# Patient Record
Sex: FEMALE | Race: BLACK OR AFRICAN AMERICAN | NOT HISPANIC OR LATINO | Employment: FULL TIME | URBAN - METROPOLITAN AREA
[De-identification: names, ages, dates, MRNs, and addresses within clinical notes are randomized per-mention and may not be internally consistent; named-entity substitution may affect disease eponyms.]

---

## 2017-05-11 ENCOUNTER — OFFICE VISIT (OUTPATIENT)
Dept: FAMILY MEDICINE CLINIC | Facility: CLINIC | Age: 22
End: 2017-05-11

## 2017-05-11 VITALS
WEIGHT: 152 LBS | HEART RATE: 80 BPM | SYSTOLIC BLOOD PRESSURE: 106 MMHG | DIASTOLIC BLOOD PRESSURE: 82 MMHG | HEIGHT: 60 IN | BODY MASS INDEX: 29.84 KG/M2 | RESPIRATION RATE: 20 BRPM | TEMPERATURE: 97 F

## 2017-05-11 DIAGNOSIS — Z76.89 ENCOUNTER TO ESTABLISH CARE: Primary | ICD-10-CM

## 2017-05-11 DIAGNOSIS — Z11.1 SCREENING FOR TUBERCULOSIS: ICD-10-CM

## 2017-05-11 PROCEDURE — 99203 OFFICE O/P NEW LOW 30 MIN: CPT | Performed by: PHYSICIAN ASSISTANT

## 2017-05-11 RX ORDER — DROSPIRENONE AND ETHINYL ESTRADIOL 0.02-3(28)
1 KIT ORAL DAILY
COMMUNITY
End: 2018-12-05 | Stop reason: HOSPADM

## 2017-05-22 LAB
ANNOTATION COMMENT IMP: NORMAL
GAMMA INTERFERON BACKGROUND BLD IA-ACNC: 0.04 IU/ML
M TB IFN-G BLD-IMP: NEGATIVE
M TB IFN-G CD4+ BCKGRND COR BLD-ACNC: <0 IU/ML
M TB IFN-G CD4+ T-CELLS BLD-ACNC: 0.03 IU/ML
MITOGEN IGNF BLD-ACNC: >10 IU/ML
QUANTIFERON INCUBATION: NORMAL
SERVICE CMNT-IMP: NORMAL

## 2018-04-27 ENCOUNTER — LAB REQUISITION (OUTPATIENT)
Dept: LAB | Facility: HOSPITAL | Age: 23
End: 2018-04-27

## 2018-04-27 DIAGNOSIS — Z00.00 ROUTINE GENERAL MEDICAL EXAMINATION AT A HEALTH CARE FACILITY: ICD-10-CM

## 2018-04-27 LAB
EXTERNAL CHLAMYDIA SCREEN: NEGATIVE
EXTERNAL GONORRHEA SCREEN: NEGATIVE
EXTERNAL HEPATITIS B SURFACE ANTIGEN: NEGATIVE
EXTERNAL HEPATITIS C AB: NEGATIVE
EXTERNAL RUBELLA QUALITATIVE: NORMAL
EXTERNAL SYPHILIS RPR SCREEN: NORMAL
EXTERNAL URINE DRUG SCREEN: NEGATIVE
HIV1 P24 AG SERPL QL IA: NORMAL

## 2018-04-27 PROCEDURE — 36415 COLL VENOUS BLD VENIPUNCTURE: CPT | Performed by: OBSTETRICS & GYNECOLOGY

## 2018-05-30 ENCOUNTER — LAB REQUISITION (OUTPATIENT)
Dept: LAB | Facility: HOSPITAL | Age: 23
End: 2018-05-30

## 2018-05-30 DIAGNOSIS — Z00.00 ROUTINE GENERAL MEDICAL EXAMINATION AT A HEALTH CARE FACILITY: ICD-10-CM

## 2018-05-30 PROCEDURE — 36415 COLL VENOUS BLD VENIPUNCTURE: CPT

## 2018-09-10 LAB — EXTERNAL GTT 1 HOUR: NORMAL

## 2018-10-30 ENCOUNTER — LAB REQUISITION (OUTPATIENT)
Dept: LAB | Facility: HOSPITAL | Age: 23
End: 2018-10-30

## 2018-10-30 DIAGNOSIS — Z34.03 ENCOUNTER FOR SUPERVISION OF NORMAL FIRST PREGNANCY IN THIRD TRIMESTER: ICD-10-CM

## 2018-10-30 PROCEDURE — 87081 CULTURE SCREEN ONLY: CPT | Performed by: OBSTETRICS & GYNECOLOGY

## 2018-11-02 LAB — BACTERIA SPEC AEROBE CULT: NORMAL

## 2018-11-30 ENCOUNTER — HOSPITAL ENCOUNTER (OUTPATIENT)
Facility: HOSPITAL | Age: 23
End: 2018-11-30
Attending: OBSTETRICS & GYNECOLOGY | Admitting: OBSTETRICS & GYNECOLOGY

## 2018-11-30 ENCOUNTER — PREP FOR SURGERY (OUTPATIENT)
Dept: OTHER | Facility: HOSPITAL | Age: 23
End: 2018-11-30

## 2018-11-30 DIAGNOSIS — Z3A.40 40 WEEKS GESTATION OF PREGNANCY: Primary | ICD-10-CM

## 2018-11-30 LAB
ABO GROUP BLD: NORMAL
ALP SERPL-CCNC: 221 U/L (ref 25–100)
ALT SERPL W P-5'-P-CCNC: 14 U/L (ref 7–40)
AST SERPL-CCNC: 26 U/L (ref 0–33)
BILIRUB SERPL-MCNC: 0.3 MG/DL (ref 0.3–1.2)
BLD GP AB SCN SERPL QL: NEGATIVE
CREAT BLD-MCNC: 0.67 MG/DL (ref 0.6–1.3)
DEPRECATED RDW RBC AUTO: 37 FL (ref 37–54)
ERYTHROCYTE [DISTWIDTH] IN BLOOD BY AUTOMATED COUNT: 13.7 % (ref 11.3–14.5)
HCT VFR BLD AUTO: 32.1 % (ref 34.5–44)
HGB BLD-MCNC: 10.1 G/DL (ref 11.5–15.5)
LDH SERPL-CCNC: 254 U/L (ref 120–246)
MCH RBC QN AUTO: 23.1 PG (ref 27–31)
MCHC RBC AUTO-ENTMCNC: 31.5 G/DL (ref 32–36)
MCV RBC AUTO: 73.5 FL (ref 80–99)
PLATELET # BLD AUTO: 312 10*3/MM3 (ref 150–450)
PMV BLD AUTO: 10.3 FL (ref 6–12)
RBC # BLD AUTO: 4.37 10*6/MM3 (ref 3.89–5.14)
RH BLD: POSITIVE
T&S EXPIRATION DATE: NORMAL
URATE SERPL-MCNC: 5.4 MG/DL (ref 3.1–7.8)
WBC NRBC COR # BLD: 10.47 10*3/MM3 (ref 4.5–13.5)

## 2018-11-30 PROCEDURE — 83615 LACTATE (LD) (LDH) ENZYME: CPT | Performed by: OBSTETRICS & GYNECOLOGY

## 2018-11-30 PROCEDURE — 84450 TRANSFERASE (AST) (SGOT): CPT | Performed by: OBSTETRICS & GYNECOLOGY

## 2018-11-30 PROCEDURE — 86900 BLOOD TYPING SEROLOGIC ABO: CPT | Performed by: NURSE PRACTITIONER

## 2018-11-30 PROCEDURE — 82247 BILIRUBIN TOTAL: CPT | Performed by: OBSTETRICS & GYNECOLOGY

## 2018-11-30 PROCEDURE — 59025 FETAL NON-STRESS TEST: CPT

## 2018-11-30 PROCEDURE — 84460 ALANINE AMINO (ALT) (SGPT): CPT | Performed by: OBSTETRICS & GYNECOLOGY

## 2018-11-30 PROCEDURE — 82565 ASSAY OF CREATININE: CPT | Performed by: OBSTETRICS & GYNECOLOGY

## 2018-11-30 PROCEDURE — 86901 BLOOD TYPING SEROLOGIC RH(D): CPT | Performed by: NURSE PRACTITIONER

## 2018-11-30 PROCEDURE — 59200 INSERT CERVICAL DILATOR: CPT | Performed by: OBSTETRICS & GYNECOLOGY

## 2018-11-30 PROCEDURE — 84075 ASSAY ALKALINE PHOSPHATASE: CPT | Performed by: OBSTETRICS & GYNECOLOGY

## 2018-11-30 PROCEDURE — 85027 COMPLETE CBC AUTOMATED: CPT | Performed by: NURSE PRACTITIONER

## 2018-11-30 PROCEDURE — 84550 ASSAY OF BLOOD/URIC ACID: CPT | Performed by: OBSTETRICS & GYNECOLOGY

## 2018-11-30 PROCEDURE — 25010000002 BUTORPHANOL PER 1 MG: Performed by: OBSTETRICS & GYNECOLOGY

## 2018-11-30 PROCEDURE — 86850 RBC ANTIBODY SCREEN: CPT | Performed by: NURSE PRACTITIONER

## 2018-11-30 RX ORDER — OXYTOCIN-SODIUM CHLORIDE 0.9% IV SOLN 30 UNIT/500ML 30-0.9/5 UT/ML-%
2-30 SOLUTION INTRAVENOUS
Status: DISCONTINUED | OUTPATIENT
Start: 2018-12-01 | End: 2018-12-02 | Stop reason: HOSPADM

## 2018-11-30 RX ORDER — SODIUM CHLORIDE 0.9 % (FLUSH) 0.9 %
3-10 SYRINGE (ML) INJECTION AS NEEDED
Status: DISCONTINUED | OUTPATIENT
Start: 2018-11-30 | End: 2018-12-02 | Stop reason: HOSPADM

## 2018-11-30 RX ORDER — OXYTOCIN-SODIUM CHLORIDE 0.9% IV SOLN 30 UNIT/500ML 30-0.9/5 UT/ML-%
85 SOLUTION INTRAVENOUS ONCE
Status: CANCELLED | OUTPATIENT
Start: 2018-11-30 | End: 2018-11-30

## 2018-11-30 RX ORDER — SODIUM CHLORIDE, SODIUM LACTATE, POTASSIUM CHLORIDE, CALCIUM CHLORIDE 600; 310; 30; 20 MG/100ML; MG/100ML; MG/100ML; MG/100ML
125 INJECTION, SOLUTION INTRAVENOUS CONTINUOUS
Status: DISCONTINUED | OUTPATIENT
Start: 2018-11-30 | End: 2018-12-05 | Stop reason: HOSPADM

## 2018-11-30 RX ORDER — SODIUM CHLORIDE 0.9 % (FLUSH) 0.9 %
3-10 SYRINGE (ML) INJECTION AS NEEDED
Status: CANCELLED | OUTPATIENT
Start: 2018-11-30

## 2018-11-30 RX ORDER — SODIUM CHLORIDE, SODIUM LACTATE, POTASSIUM CHLORIDE, CALCIUM CHLORIDE 600; 310; 30; 20 MG/100ML; MG/100ML; MG/100ML; MG/100ML
125 INJECTION, SOLUTION INTRAVENOUS CONTINUOUS
Status: CANCELLED | OUTPATIENT
Start: 2018-11-30

## 2018-11-30 RX ORDER — OXYTOCIN-SODIUM CHLORIDE 0.9% IV SOLN 30 UNIT/500ML 30-0.9/5 UT/ML-%
650 SOLUTION INTRAVENOUS ONCE
Status: CANCELLED | OUTPATIENT
Start: 2018-11-30 | End: 2018-11-30

## 2018-11-30 RX ORDER — SODIUM CHLORIDE 0.9 % (FLUSH) 0.9 %
3 SYRINGE (ML) INJECTION EVERY 12 HOURS SCHEDULED
Status: DISCONTINUED | OUTPATIENT
Start: 2018-11-30 | End: 2018-12-02 | Stop reason: HOSPADM

## 2018-11-30 RX ORDER — MAGNESIUM CARB/ALUMINUM HYDROX 105-160MG
30 TABLET,CHEWABLE ORAL ONCE
Status: DISCONTINUED | OUTPATIENT
Start: 2018-11-30 | End: 2018-12-02 | Stop reason: HOSPADM

## 2018-11-30 RX ORDER — SODIUM CHLORIDE 0.9 % (FLUSH) 0.9 %
3 SYRINGE (ML) INJECTION EVERY 12 HOURS SCHEDULED
Status: CANCELLED | OUTPATIENT
Start: 2018-11-30

## 2018-11-30 RX ORDER — PRENATAL WITH FERROUS FUM AND FOLIC ACID 3080; 920; 120; 400; 22; 1.84; 3; 20; 10; 1; 12; 200; 27; 25; 2 [IU]/1; [IU]/1; MG/1; [IU]/1; MG/1; MG/1; MG/1; MG/1; MG/1; MG/1; UG/1; MG/1; MG/1; MG/1; MG/1
1 TABLET ORAL DAILY
COMMUNITY

## 2018-11-30 RX ORDER — MAGNESIUM CARB/ALUMINUM HYDROX 105-160MG
30 TABLET,CHEWABLE ORAL ONCE
Status: CANCELLED | OUTPATIENT
Start: 2018-11-30 | End: 2018-11-30

## 2018-11-30 RX ORDER — BUTORPHANOL TARTRATE 1 MG/ML
2 INJECTION, SOLUTION INTRAMUSCULAR; INTRAVENOUS
Status: DISCONTINUED | OUTPATIENT
Start: 2018-11-30 | End: 2018-12-05 | Stop reason: HOSPADM

## 2018-11-30 RX ORDER — MISOPROSTOL 200 UG/1
800 TABLET ORAL AS NEEDED
Status: CANCELLED | OUTPATIENT
Start: 2018-11-30

## 2018-11-30 RX ORDER — METHYLERGONOVINE MALEATE 0.2 MG/ML
200 INJECTION INTRAVENOUS ONCE AS NEEDED
Status: CANCELLED | OUTPATIENT
Start: 2018-11-30

## 2018-11-30 RX ORDER — CARBOPROST TROMETHAMINE 250 UG/ML
250 INJECTION, SOLUTION INTRAMUSCULAR AS NEEDED
Status: CANCELLED | OUTPATIENT
Start: 2018-11-30

## 2018-11-30 RX ORDER — OXYTOCIN-SODIUM CHLORIDE 0.9% IV SOLN 30 UNIT/500ML 30-0.9/5 UT/ML-%
2-30 SOLUTION INTRAVENOUS
Status: CANCELLED | OUTPATIENT
Start: 2018-12-01

## 2018-11-30 RX ADMIN — BUTORPHANOL TARTRATE 2 MG: 1 INJECTION, SOLUTION INTRAMUSCULAR; INTRAVENOUS at 23:30

## 2018-12-01 ENCOUNTER — ANESTHESIA (OUTPATIENT)
Dept: LABOR AND DELIVERY | Facility: HOSPITAL | Age: 23
End: 2018-12-01

## 2018-12-01 ENCOUNTER — ANESTHESIA EVENT (OUTPATIENT)
Dept: LABOR AND DELIVERY | Facility: HOSPITAL | Age: 23
End: 2018-12-01

## 2018-12-01 PROBLEM — O48.0 POST-DATES PREGNANCY: Status: ACTIVE | Noted: 2018-12-01

## 2018-12-01 PROCEDURE — 25010000002 METOCLOPRAMIDE PER 10 MG: Performed by: ANESTHESIOLOGY

## 2018-12-01 PROCEDURE — 86900 BLOOD TYPING SEROLOGIC ABO: CPT

## 2018-12-01 PROCEDURE — 86901 BLOOD TYPING SEROLOGIC RH(D): CPT

## 2018-12-01 PROCEDURE — 25010000003 CEFAZOLIN PER 500 MG: Performed by: OBSTETRICS & GYNECOLOGY

## 2018-12-01 PROCEDURE — 25010000002 ROPIVACAINE PER 1 MG: Performed by: ANESTHESIOLOGY

## 2018-12-01 PROCEDURE — 25010000002 ONDANSETRON PER 1 MG: Performed by: ANESTHESIOLOGY

## 2018-12-01 PROCEDURE — 86880 COOMBS TEST DIRECT: CPT

## 2018-12-01 PROCEDURE — C1755 CATHETER, INTRASPINAL: HCPCS | Performed by: ANESTHESIOLOGY

## 2018-12-01 RX ORDER — DIPHENHYDRAMINE HCL 25 MG
25 CAPSULE ORAL EVERY 6 HOURS PRN
Status: DISCONTINUED | OUTPATIENT
Start: 2018-12-01 | End: 2018-12-02 | Stop reason: HOSPADM

## 2018-12-01 RX ORDER — CEFAZOLIN SODIUM IN 0.9 % NACL 3 G/100 ML
3 INTRAVENOUS SOLUTION, PIGGYBACK (ML) INTRAVENOUS ONCE
Status: COMPLETED | OUTPATIENT
Start: 2018-12-01 | End: 2018-12-01

## 2018-12-01 RX ORDER — ROPIVACAINE HYDROCHLORIDE 2 MG/ML
14 INJECTION, SOLUTION EPIDURAL; INFILTRATION; PERINEURAL CONTINUOUS
Status: DISCONTINUED | OUTPATIENT
Start: 2018-12-01 | End: 2018-12-05 | Stop reason: HOSPADM

## 2018-12-01 RX ORDER — METOCLOPRAMIDE HYDROCHLORIDE 5 MG/ML
10 INJECTION INTRAMUSCULAR; INTRAVENOUS ONCE AS NEEDED
Status: COMPLETED | OUTPATIENT
Start: 2018-12-01 | End: 2018-12-01

## 2018-12-01 RX ORDER — TRISODIUM CITRATE DIHYDRATE AND CITRIC ACID MONOHYDRATE 500; 334 MG/5ML; MG/5ML
30 SOLUTION ORAL ONCE
Status: COMPLETED | OUTPATIENT
Start: 2018-12-01 | End: 2018-12-01

## 2018-12-01 RX ORDER — EPHEDRINE SULFATE/0.9% NACL/PF 25 MG/5 ML
10 SYRINGE (ML) INTRAVENOUS
Status: DISCONTINUED | OUTPATIENT
Start: 2018-12-01 | End: 2018-12-02 | Stop reason: HOSPADM

## 2018-12-01 RX ORDER — LIDOCAINE HYDROCHLORIDE AND EPINEPHRINE 20; 5 MG/ML; UG/ML
INJECTION, SOLUTION EPIDURAL; INFILTRATION; INTRACAUDAL; PERINEURAL AS NEEDED
Status: DISCONTINUED | OUTPATIENT
Start: 2018-12-01 | End: 2018-12-02 | Stop reason: SURG

## 2018-12-01 RX ORDER — ONDANSETRON 2 MG/ML
4 INJECTION INTRAMUSCULAR; INTRAVENOUS ONCE AS NEEDED
Status: COMPLETED | OUTPATIENT
Start: 2018-12-01 | End: 2018-12-01

## 2018-12-01 RX ORDER — FENTANYL CITRATE 50 UG/ML
INJECTION, SOLUTION INTRAMUSCULAR; INTRAVENOUS AS NEEDED
Status: DISCONTINUED | OUTPATIENT
Start: 2018-12-01 | End: 2018-12-02 | Stop reason: SURG

## 2018-12-01 RX ORDER — LIDOCAINE HYDROCHLORIDE AND EPINEPHRINE 15; 5 MG/ML; UG/ML
INJECTION, SOLUTION EPIDURAL AS NEEDED
Status: DISCONTINUED | OUTPATIENT
Start: 2018-12-01 | End: 2018-12-02 | Stop reason: SURG

## 2018-12-01 RX ADMIN — FENTANYL CITRATE 100 MCG: 50 INJECTION, SOLUTION INTRAMUSCULAR; INTRAVENOUS at 10:29

## 2018-12-01 RX ADMIN — OXYTOCIN-SODIUM CHLORIDE 0.9% IV SOLN 30 UNIT/500ML 20 MILLI-UNITS/MIN: 30-0.9/5 SOLUTION at 15:01

## 2018-12-01 RX ADMIN — LIDOCAINE HYDROCHLORIDE,EPINEPHRINE BITARTRATE 5 ML: 20; .005 INJECTION, SOLUTION EPIDURAL; INFILTRATION; INTRACAUDAL; PERINEURAL at 16:17

## 2018-12-01 RX ADMIN — CEFAZOLIN 3 G: 1 INJECTION, POWDER, FOR SOLUTION INTRAMUSCULAR; INTRAVENOUS at 22:36

## 2018-12-01 RX ADMIN — SODIUM CHLORIDE, POTASSIUM CHLORIDE, SODIUM LACTATE AND CALCIUM CHLORIDE 1000 ML: 600; 310; 30; 20 INJECTION, SOLUTION INTRAVENOUS at 09:36

## 2018-12-01 RX ADMIN — OXYTOCIN-SODIUM CHLORIDE 0.9% IV SOLN 30 UNIT/500ML 2 MILLI-UNITS/MIN: 30-0.9/5 SOLUTION at 06:43

## 2018-12-01 RX ADMIN — LIDOCAINE HYDROCHLORIDE AND EPINEPHRINE 2 ML: 15; 5 INJECTION, SOLUTION EPIDURAL at 10:25

## 2018-12-01 RX ADMIN — LIDOCAINE HYDROCHLORIDE,EPINEPHRINE BITARTRATE 5 ML: 20; .005 INJECTION, SOLUTION EPIDURAL; INFILTRATION; INTRACAUDAL; PERINEURAL at 20:53

## 2018-12-01 RX ADMIN — SODIUM CHLORIDE, POTASSIUM CHLORIDE, SODIUM LACTATE AND CALCIUM CHLORIDE 1000 ML: 600; 310; 30; 20 INJECTION, SOLUTION INTRAVENOUS at 09:46

## 2018-12-01 RX ADMIN — ROPIVACAINE HYDROCHLORIDE 10 ML: 5 INJECTION, SOLUTION EPIDURAL; INFILTRATION; PERINEURAL at 10:27

## 2018-12-01 RX ADMIN — METOCLOPRAMIDE 10 MG: 5 INJECTION, SOLUTION INTRAMUSCULAR; INTRAVENOUS at 19:59

## 2018-12-01 RX ADMIN — ROPIVACAINE HYDROCHLORIDE 14 ML/HR: 2 INJECTION, SOLUTION EPIDURAL; INFILTRATION at 10:31

## 2018-12-01 RX ADMIN — ONDANSETRON 4 MG: 2 INJECTION, SOLUTION INTRAMUSCULAR; INTRAVENOUS at 14:56

## 2018-12-01 RX ADMIN — SODIUM CHLORIDE, POTASSIUM CHLORIDE, SODIUM LACTATE AND CALCIUM CHLORIDE 125 ML/HR: 600; 310; 30; 20 INJECTION, SOLUTION INTRAVENOUS at 18:07

## 2018-12-01 RX ADMIN — SODIUM CHLORIDE, POTASSIUM CHLORIDE, SODIUM LACTATE AND CALCIUM CHLORIDE 125 ML/HR: 600; 310; 30; 20 INJECTION, SOLUTION INTRAVENOUS at 01:35

## 2018-12-01 RX ADMIN — SODIUM CITRATE AND CITRIC ACID MONOHYDRATE 30 ML: 500; 334 SOLUTION ORAL at 23:54

## 2018-12-01 RX ADMIN — ROPIVACAINE HYDROCHLORIDE 16 ML/HR: 2 INJECTION, SOLUTION EPIDURAL; INFILTRATION at 20:53

## 2018-12-01 RX ADMIN — SODIUM CHLORIDE, POTASSIUM CHLORIDE, SODIUM LACTATE AND CALCIUM CHLORIDE 1000 ML: 600; 310; 30; 20 INJECTION, SOLUTION INTRAVENOUS at 23:23

## 2018-12-01 RX ADMIN — LIDOCAINE HYDROCHLORIDE AND EPINEPHRINE 3 ML: 15; 5 INJECTION, SOLUTION EPIDURAL at 10:23

## 2018-12-01 NOTE — ANESTHESIA PREPROCEDURE EVALUATION
Anesthesia Evaluation     Patient summary reviewed and Nursing notes reviewed   NPO Solid Status: > 8 hours  NPO Liquid Status: > 8 hours           Airway   Mallampati: III  TM distance: <3 FB  Neck ROM: full  Difficult intubation highly probable and Small opening  Dental      Pulmonary - negative pulmonary ROS   Cardiovascular - negative cardio ROS        Neuro/Psych- negative ROS  GI/Hepatic/Renal/Endo    (+) morbid obesity,      Musculoskeletal (-) negative ROS    Abdominal    Substance History - negative use     OB/GYN    (+) Pregnant,         Other - negative ROS                       Anesthesia Plan    ASA 3     epidural     Anesthetic plan, all risks, benefits, and alternatives have been provided, discussed and informed consent has been obtained with: patient and spouse/significant other.

## 2018-12-01 NOTE — ANESTHESIA PROCEDURE NOTES
Labor Epidural      Patient reassessed immediately prior to procedure    Patient location during procedure: OB  Performed By  Anesthesiologist: Perla Peterson DO  Preanesthetic Checklist  Completed: patient identified, surgical consent, pre-op evaluation, timeout performed, IV checked, risks and benefits discussed and monitors and equipment checked  Prep:  Pt Position:sitting  Sterile Tech:cap, gloves, mask and sterile barrier  Prep:DuraPrep  Monitoring:blood pressure monitoring  Epidural Block Procedure:  Approach:midline  Guidance:palpation technique  Location:L3-L4  Needle Type:Tuohy  Needle Gauge:17 G  Loss of Resistance Medium: air  Loss of Resistance: 6cm  Cath Depth at skin:12 cm  Paresthesia: none  Aspiration:negative  Test Dose:negative  Number of Attempts: 1  Post Assessment:  Dressing:occlusive dressing applied and secured with tape  Pt Tolerance:patient tolerated the procedure well with no apparent complications  Complications:no

## 2018-12-01 NOTE — PROCEDURES
Transcervical mcnally placed per physician request.  FHT's class 1.  Patient tolerated well. 24 Palauan, 60ml.    Rick Wilson MD  11/30/2018  10:19 PM

## 2018-12-02 LAB
DEPRECATED RDW RBC AUTO: 37.2 FL (ref 37–54)
ERYTHROCYTE [DISTWIDTH] IN BLOOD BY AUTOMATED COUNT: 13.7 % (ref 11.3–14.5)
HCT VFR BLD AUTO: 24.8 % (ref 34.5–44)
HGB BLD-MCNC: 7.7 G/DL (ref 11.5–15.5)
MCH RBC QN AUTO: 22.8 PG (ref 27–31)
MCHC RBC AUTO-ENTMCNC: 31 G/DL (ref 32–36)
MCV RBC AUTO: 73.6 FL (ref 80–99)
PLATELET # BLD AUTO: 253 10*3/MM3 (ref 150–450)
PMV BLD AUTO: 10.5 FL (ref 6–12)
RBC # BLD AUTO: 3.37 10*6/MM3 (ref 3.89–5.14)
WBC NRBC COR # BLD: 12.89 10*3/MM3 (ref 4.5–13.5)

## 2018-12-02 PROCEDURE — 25010000002 MIDAZOLAM PER 1 MG: Performed by: ANESTHESIOLOGY

## 2018-12-02 PROCEDURE — 25010000002 FENTANYL CITRATE (PF) 100 MCG/2ML SOLUTION: Performed by: ANESTHESIOLOGY

## 2018-12-02 PROCEDURE — 25010000003 MORPHINE PER 10 MG: Performed by: ANESTHESIOLOGY

## 2018-12-02 PROCEDURE — 59025 FETAL NON-STRESS TEST: CPT

## 2018-12-02 PROCEDURE — 85027 COMPLETE CBC AUTOMATED: CPT | Performed by: OBSTETRICS & GYNECOLOGY

## 2018-12-02 PROCEDURE — 25010000002 METOCLOPRAMIDE PER 10 MG: Performed by: ANESTHESIOLOGY

## 2018-12-02 PROCEDURE — 59514 CESAREAN DELIVERY ONLY: CPT | Performed by: OBSTETRICS & GYNECOLOGY

## 2018-12-02 RX ORDER — CARBOPROST TROMETHAMINE 250 UG/ML
250 INJECTION, SOLUTION INTRAMUSCULAR AS NEEDED
Status: DISCONTINUED | OUTPATIENT
Start: 2018-12-02 | End: 2018-12-02 | Stop reason: HOSPADM

## 2018-12-02 RX ORDER — HYDROMORPHONE HYDROCHLORIDE 1 MG/ML
0.5 INJECTION, SOLUTION INTRAMUSCULAR; INTRAVENOUS; SUBCUTANEOUS
Status: DISCONTINUED | OUTPATIENT
Start: 2018-12-02 | End: 2018-12-02 | Stop reason: HOSPADM

## 2018-12-02 RX ORDER — DOCUSATE SODIUM 100 MG/1
100 CAPSULE, LIQUID FILLED ORAL 2 TIMES DAILY PRN
Status: DISCONTINUED | OUTPATIENT
Start: 2018-12-02 | End: 2018-12-05 | Stop reason: HOSPADM

## 2018-12-02 RX ORDER — ONDANSETRON 4 MG/1
4 TABLET, FILM COATED ORAL EVERY 8 HOURS PRN
Status: DISCONTINUED | OUTPATIENT
Start: 2018-12-02 | End: 2018-12-05 | Stop reason: HOSPADM

## 2018-12-02 RX ORDER — ONDANSETRON 2 MG/ML
4 INJECTION INTRAMUSCULAR; INTRAVENOUS ONCE
Status: DISCONTINUED | OUTPATIENT
Start: 2018-12-02 | End: 2018-12-02 | Stop reason: HOSPADM

## 2018-12-02 RX ORDER — MISOPROSTOL 200 UG/1
800 TABLET ORAL AS NEEDED
Status: DISCONTINUED | OUTPATIENT
Start: 2018-12-02 | End: 2018-12-02 | Stop reason: HOSPADM

## 2018-12-02 RX ORDER — IBUPROFEN 600 MG/1
600 TABLET ORAL ONCE AS NEEDED
Status: DISCONTINUED | OUTPATIENT
Start: 2018-12-02 | End: 2018-12-02 | Stop reason: HOSPADM

## 2018-12-02 RX ORDER — IBUPROFEN 600 MG/1
600 TABLET ORAL EVERY 6 HOURS PRN
Status: DISCONTINUED | OUTPATIENT
Start: 2018-12-02 | End: 2018-12-05 | Stop reason: HOSPADM

## 2018-12-02 RX ORDER — OXYCODONE AND ACETAMINOPHEN 10; 325 MG/1; MG/1
1 TABLET ORAL EVERY 4 HOURS PRN
Status: DISCONTINUED | OUTPATIENT
Start: 2018-12-02 | End: 2018-12-05 | Stop reason: HOSPADM

## 2018-12-02 RX ORDER — OXYTOCIN-SODIUM CHLORIDE 0.9% IV SOLN 30 UNIT/500ML 30-0.9/5 UT/ML-%
650 SOLUTION INTRAVENOUS ONCE
Status: DISCONTINUED | OUTPATIENT
Start: 2018-12-02 | End: 2018-12-02 | Stop reason: HOSPADM

## 2018-12-02 RX ORDER — FAMOTIDINE 10 MG/ML
INJECTION, SOLUTION INTRAVENOUS AS NEEDED
Status: DISCONTINUED | OUTPATIENT
Start: 2018-12-02 | End: 2018-12-02 | Stop reason: SURG

## 2018-12-02 RX ORDER — BUPIVACAINE HYDROCHLORIDE 7.5 MG/ML
INJECTION, SOLUTION EPIDURAL; RETROBULBAR AS NEEDED
Status: DISCONTINUED | OUTPATIENT
Start: 2018-12-02 | End: 2018-12-02 | Stop reason: SURG

## 2018-12-02 RX ORDER — OXYTOCIN-SODIUM CHLORIDE 0.9% IV SOLN 30 UNIT/500ML 30-0.9/5 UT/ML-%
85 SOLUTION INTRAVENOUS ONCE
Status: DISCONTINUED | OUTPATIENT
Start: 2018-12-02 | End: 2018-12-02 | Stop reason: HOSPADM

## 2018-12-02 RX ORDER — ACETAMINOPHEN 325 MG/1
650 TABLET ORAL ONCE AS NEEDED
Status: DISCONTINUED | OUTPATIENT
Start: 2018-12-02 | End: 2018-12-02 | Stop reason: HOSPADM

## 2018-12-02 RX ORDER — METOCLOPRAMIDE HYDROCHLORIDE 5 MG/ML
INJECTION INTRAMUSCULAR; INTRAVENOUS AS NEEDED
Status: DISCONTINUED | OUTPATIENT
Start: 2018-12-02 | End: 2018-12-02 | Stop reason: SURG

## 2018-12-02 RX ORDER — OXYTOCIN 10 [USP'U]/ML
INJECTION, SOLUTION INTRAMUSCULAR; INTRAVENOUS AS NEEDED
Status: DISCONTINUED | OUTPATIENT
Start: 2018-12-02 | End: 2018-12-02 | Stop reason: SURG

## 2018-12-02 RX ORDER — MIDAZOLAM HYDROCHLORIDE 1 MG/ML
INJECTION INTRAMUSCULAR; INTRAVENOUS AS NEEDED
Status: DISCONTINUED | OUTPATIENT
Start: 2018-12-02 | End: 2018-12-02 | Stop reason: SURG

## 2018-12-02 RX ORDER — METHYLERGONOVINE MALEATE 0.2 MG/ML
200 INJECTION INTRAVENOUS ONCE AS NEEDED
Status: DISCONTINUED | OUTPATIENT
Start: 2018-12-02 | End: 2018-12-02 | Stop reason: HOSPADM

## 2018-12-02 RX ORDER — OXYCODONE AND ACETAMINOPHEN 7.5; 325 MG/1; MG/1
1 TABLET ORAL EVERY 4 HOURS PRN
Status: DISCONTINUED | OUTPATIENT
Start: 2018-12-02 | End: 2018-12-05 | Stop reason: HOSPADM

## 2018-12-02 RX ORDER — MORPHINE SULFATE 0.5 MG/ML
INJECTION, SOLUTION EPIDURAL; INTRATHECAL; INTRAVENOUS AS NEEDED
Status: DISCONTINUED | OUTPATIENT
Start: 2018-12-02 | End: 2018-12-02 | Stop reason: SURG

## 2018-12-02 RX ADMIN — SODIUM CHLORIDE, POTASSIUM CHLORIDE, SODIUM LACTATE AND CALCIUM CHLORIDE 125 ML/HR: 600; 310; 30; 20 INJECTION, SOLUTION INTRAVENOUS at 01:50

## 2018-12-02 RX ADMIN — FENTANYL CITRATE 15 MCG: 50 INJECTION, SOLUTION INTRAMUSCULAR; INTRAVENOUS at 00:31

## 2018-12-02 RX ADMIN — FAMOTIDINE 20 MG: 10 INJECTION, SOLUTION INTRAVENOUS at 00:41

## 2018-12-02 RX ADMIN — SODIUM CHLORIDE, POTASSIUM CHLORIDE, SODIUM LACTATE AND CALCIUM CHLORIDE: 600; 310; 30; 20 INJECTION, SOLUTION INTRAVENOUS at 00:53

## 2018-12-02 RX ADMIN — EPHEDRINE SULFATE 10 MG: 50 INJECTION INTRAMUSCULAR; INTRAVENOUS; SUBCUTANEOUS at 00:39

## 2018-12-02 RX ADMIN — METOCLOPRAMIDE 10 MG: 5 INJECTION, SOLUTION INTRAMUSCULAR; INTRAVENOUS at 00:42

## 2018-12-02 RX ADMIN — OXYTOCIN 30 UNITS: 10 INJECTION, SOLUTION INTRAMUSCULAR; INTRAVENOUS at 00:53

## 2018-12-02 RX ADMIN — OXYCODONE HYDROCHLORIDE AND ACETAMINOPHEN 1 TABLET: 10; 325 TABLET ORAL at 21:48

## 2018-12-02 RX ADMIN — IBUPROFEN 600 MG: 600 TABLET ORAL at 04:13

## 2018-12-02 RX ADMIN — OXYCODONE HYDROCHLORIDE AND ACETAMINOPHEN 1 TABLET: 10; 325 TABLET ORAL at 01:50

## 2018-12-02 RX ADMIN — DOCUSATE SODIUM 100 MG: 100 CAPSULE, LIQUID FILLED ORAL at 13:33

## 2018-12-02 RX ADMIN — IBUPROFEN 600 MG: 600 TABLET ORAL at 13:33

## 2018-12-02 RX ADMIN — SODIUM CHLORIDE, POTASSIUM CHLORIDE, SODIUM LACTATE AND CALCIUM CHLORIDE: 600; 310; 30; 20 INJECTION, SOLUTION INTRAVENOUS at 00:22

## 2018-12-02 RX ADMIN — MIDAZOLAM HYDROCHLORIDE 1 MG: 1 INJECTION, SOLUTION INTRAMUSCULAR; INTRAVENOUS at 00:23

## 2018-12-02 RX ADMIN — OXYCODONE HYDROCHLORIDE AND ACETAMINOPHEN 1 TABLET: 7.5; 325 TABLET ORAL at 15:19

## 2018-12-02 RX ADMIN — MIDAZOLAM HYDROCHLORIDE 1 MG: 1 INJECTION, SOLUTION INTRAMUSCULAR; INTRAVENOUS at 00:53

## 2018-12-02 RX ADMIN — BUPIVACAINE HYDROCHLORIDE 1.1 ML: 7.5 INJECTION, SOLUTION EPIDURAL; RETROBULBAR at 00:31

## 2018-12-02 RX ADMIN — MIDAZOLAM HYDROCHLORIDE 1 MG: 1 INJECTION, SOLUTION INTRAMUSCULAR; INTRAVENOUS at 01:00

## 2018-12-02 RX ADMIN — IBUPROFEN 600 MG: 600 TABLET ORAL at 21:47

## 2018-12-02 RX ADMIN — MORPHINE SULFATE 0.1 MG: 0.5 INJECTION, SOLUTION EPIDURAL; INTRATHECAL; INTRAVENOUS at 00:31

## 2018-12-02 NOTE — ANESTHESIA PROCEDURE NOTES
Spinal Block      Patient reassessed immediately prior to procedure    Patient location during procedure: OB  Indication:procedure for pain  Performed By  Anesthesiologist: Perla Peterson DO  Preanesthetic Checklist  Completed: patient identified, surgical consent, pre-op evaluation, timeout performed, IV checked, risks and benefits discussed and monitors and equipment checked  Spinal Block Prep:  Patient Position:sitting  Sterile Tech:cap, gloves, mask and sterile barriers  Prep:Betadine  Patient Monitoring:blood pressure monitoring and EKG  Spinal Block Procedure  Approach:midline  Guidance:palpation technique  Location:L3-L4  Needle Type:Larry  Needle Gauge:25 G  Placement of Spinal needle event:cerebrospinal fluid aspirated  Paresthesia: no  Fluid Appearance:clear     Post Assessment  Patient Tolerance:patient tolerated the procedure well with no apparent complications  Complications no

## 2018-12-02 NOTE — LACTATION NOTE
12/02/18 1030   Maternal Information   Person Making Referral other (see comments)  (Courtesy visit, Teaching done.)   Equipment Type   Breast Pump Type double electric, personal

## 2018-12-02 NOTE — INTERVAL H&P NOTE
H&P reviewed. The patient was examined and there are no changes to the H&P. We will proceed with primary C/S for failure to progress

## 2018-12-02 NOTE — ANESTHESIA POSTPROCEDURE EVALUATION
Patient: Geni Salazar    Procedure Summary     Date:  18 Room / Location:  [facility] LABOR DELIVERY 2 /  [facility]OR [facility]IV[facility]    Anesthesia Start:  1015 Anesthesia Stop:      Procedure:   SECTION PRIMARY (N/A Abdomen) Diagnosis:      Surgeon:  Manoj Lombardo MD Provider:  Perla Peterson DO    Anesthesia Type:  epidural ASA Status:  3          Anesthesia Type: epidural  Last vitals  BP   119/85 (18 0130)   Temp   98 °F (36.7 °C) (18 0125)   Pulse   120   Resp   18     SpO2   96 % (18)     Post Anesthesia Care and Evaluation    Patient location during evaluation: bedside  Patient participation: complete - patient participated  Level of consciousness: awake and awake and alert  Pain score: 0  Pain management: satisfactory to patient  Airway patency: patent  Anesthetic complications: No anesthetic complications  PONV Status: none  Cardiovascular status: acceptable  Respiratory status: acceptable  Hydration status: acceptable  Post Neuraxial Block status: No signs or symptoms of PDPH

## 2018-12-02 NOTE — OP NOTE
Operative Note    Patient name: Summer Washington  YOB: 1995   MRN: [id number]  Admission Date: 2018  Referring Provider: Lolis Luu*    ID: 23 y.o.  at 40w4d    Preoperative Diagnosis:   Patient Active Problem List   Diagnosis   • Post-dates pregnancy     Failure to progress    Postoperative Diagnosis: Same as above.    Procedure(s): primarylow transverse  delivery     Surgeons: Surgeon(s) and Role:     * Manoj Lombardo MD - Primary     * Rick Wilson MD - Assisting    Anesthesia: Combined spinal epidural    Estimated Blood Loss: 700 mL mL    IV Fluids: [unfilled]    Preoperative antibiotic: Ancef (cefazolin) 2 grams    Blood products:   Blood Administration Record (From admission, onward)    None          Pathology: * No orders in the log *    Drains: Darnell catheter to gravity    Complications: None    Condition: Stable to recovery room                                          Infant:                 Gender: female  infant    Weight: 3115 g (6 lb 13.9 oz)     Apgars: 8   @ 1 minute /     9   @ 5 minutes    Cord gases: Venous:  @BABYNOHDR(BRIEFLAB, PHCVEN, BECVEN)@     Arterial:  @BABYNOHDR(BRIEFLAB, PHCART, BECART)@         Operative Summary:   After obtaining informed consent the patient was taken to the operating room where adequate anesthesia was obtained.  Darnell catheter was placed in the bladder preoperatively.  IV antibiotics were given preoperatively.       The abdomen was prepped and draped in the usual sterile fashion for  delivery.  After confirming adequate anesthesia a Pfannenstiel skin incision was made with the scalpel and carried through to the underlying layer of fascia.  The fascia was incised in the midline and the incision extended laterally with the Mccormick scissors and with blunt dissection.       The upper aspect of the fascia was grasped with 2 Kocher clamps, elevated, and dissected off the underlying rectus muscles bluntly and with  the Mccormick scissors.  The Kocher clamps were removed and applied to the inferior aspect of the fascia.  The fascia was dissected off of the rectus muscles in the same fashion.  The peritoneum was entered bluntly.  The incision was stretched and the bladder blade and Mccauley retractor inserted for visualization of the uterus.      The uterus was incised with the scalpel in a low transverse fashion.  The uterine incision was entered digitally and the incision extended bluntly in a cranial-caudal fashion.  Retractors were removed and membranes were ruptured.  The infant was delivered atraumatically from cephalic presentation.  The umbilical cord was clamped and cut and the nose and mouth bulb suctioned.  The infant was handed off to waiting pediatric staff.      Cord blood gases were not collected.  Cord blood was collected.  The placenta was removed using cord traction and uterine massage.  The uterus was exteriorized and cleared of all clots and debris.  The uterine incision was repaired with 1-0 Chromic in a running locked fashion. A single-layer technique was used.  Additional hemostatic measures required: none and electrocautery.    The incision was inspected and excellent hemostasis was noted.  The tubes and ovaries were noted to be normal. The uterus was returned to the abdomen.  The gutters were cleared of all clots and debris.  Irrigation was used.  The uterine incision was again inspected and found to be hemostatic.      The peritoneum was reapproximated with 2.0 Vicryl .  The fascia was closed with 0 Vicryl  in a running fashion (two segments).  The subcutaneous space was reapproximated using 3.0 Plain.      The skin was closed using staples.  The patient was transferred to the recovery room in stable condition.    Manoj Lombardo MD  12/2/2018  1:23 AM

## 2018-12-02 NOTE — PLAN OF CARE
Problem: Patient Care Overview  Goal: Plan of Care Review  Outcome: Ongoing (interventions implemented as appropriate)   12/02/18 0900   Coping/Psychosocial   Plan of Care Reviewed With patient;significant other   OTHER   Outcome Summary Instructed on what feeding cues look like and encouraged to feed on demand and never longer than 3 hours before attempting.          15-Oct-2018

## 2018-12-03 ENCOUNTER — HOSPITAL ENCOUNTER (INPATIENT)
Dept: LABOR AND DELIVERY | Facility: HOSPITAL | Age: 23
LOS: 4 days | Discharge: HOME | End: 2018-12-05
Attending: OBSTETRICS & GYNECOLOGY | Admitting: OBSTETRICS & GYNECOLOGY

## 2018-12-03 DIAGNOSIS — Z3A.40 40 WEEKS GESTATION OF PREGNANCY: ICD-10-CM

## 2018-12-03 LAB
BASOPHILS # BLD AUTO: 0.03 10*3/MM3 (ref 0–0.2)
BASOPHILS NFR BLD AUTO: 0.2 % (ref 0–1)
DEPRECATED RDW RBC AUTO: 37.2 FL (ref 37–54)
EOSINOPHIL # BLD AUTO: 0.11 10*3/MM3 (ref 0–0.3)
EOSINOPHIL NFR BLD AUTO: 0.9 % (ref 0–3)
ERYTHROCYTE [DISTWIDTH] IN BLOOD BY AUTOMATED COUNT: 13.7 % (ref 11.3–14.5)
HCT VFR BLD AUTO: 25.5 % (ref 34.5–44)
HGB BLD-MCNC: 7.9 G/DL (ref 11.5–15.5)
IMM GRANULOCYTES # BLD: 0.04 10*3/MM3 (ref 0–0.03)
IMM GRANULOCYTES NFR BLD: 0.3 % (ref 0–0.6)
LYMPHOCYTES # BLD AUTO: 2.38 10*3/MM3 (ref 0.6–4.8)
LYMPHOCYTES NFR BLD AUTO: 19 % (ref 24–44)
MCH RBC QN AUTO: 22.9 PG (ref 27–31)
MCHC RBC AUTO-ENTMCNC: 31 G/DL (ref 32–36)
MCV RBC AUTO: 73.9 FL (ref 80–99)
MONOCYTES # BLD AUTO: 1.86 10*3/MM3 (ref 0–1)
MONOCYTES NFR BLD AUTO: 14.8 % (ref 0–12)
NEUTROPHILS # BLD AUTO: 8.16 10*3/MM3 (ref 1.5–8.3)
NEUTROPHILS NFR BLD AUTO: 65.1 % (ref 41–71)
PLAT MORPH BLD: NORMAL
PLATELET # BLD AUTO: 264 10*3/MM3 (ref 150–450)
PMV BLD AUTO: 10.3 FL (ref 6–12)
RBC # BLD AUTO: 3.45 10*6/MM3 (ref 3.89–5.14)
RBC MORPH BLD: NORMAL
WBC MORPH BLD: NORMAL
WBC NRBC COR # BLD: 12.54 10*3/MM3 (ref 4.5–13.5)

## 2018-12-03 PROCEDURE — 85025 COMPLETE CBC W/AUTO DIFF WBC: CPT | Performed by: OBSTETRICS & GYNECOLOGY

## 2018-12-03 PROCEDURE — 85007 BL SMEAR W/DIFF WBC COUNT: CPT | Performed by: OBSTETRICS & GYNECOLOGY

## 2018-12-03 RX ORDER — FERROUS SULFATE 325(65) MG
325 TABLET ORAL 2 TIMES DAILY WITH MEALS
Status: DISCONTINUED | OUTPATIENT
Start: 2018-12-03 | End: 2018-12-05 | Stop reason: HOSPADM

## 2018-12-03 RX ORDER — NALOXONE HCL 0.4 MG/ML
0.4 VIAL (ML) INJECTION
Status: ACTIVE | OUTPATIENT
Start: 2018-12-03 | End: 2018-12-04

## 2018-12-03 RX ORDER — LANOLIN 100 %
OINTMENT (GRAM) TOPICAL AS NEEDED
Status: DISCONTINUED | OUTPATIENT
Start: 2018-12-03 | End: 2018-12-05 | Stop reason: HOSPADM

## 2018-12-03 RX ORDER — SIMETHICONE 80 MG
80 TABLET,CHEWABLE ORAL 4 TIMES DAILY PRN
Status: DISCONTINUED | OUTPATIENT
Start: 2018-12-03 | End: 2018-12-05 | Stop reason: HOSPADM

## 2018-12-03 RX ADMIN — OXYCODONE HYDROCHLORIDE AND ACETAMINOPHEN 1 TABLET: 10; 325 TABLET ORAL at 18:41

## 2018-12-03 RX ADMIN — SIMETHICONE CHEW TAB 80 MG 80 MG: 80 TABLET ORAL at 11:06

## 2018-12-03 RX ADMIN — Medication: at 11:06

## 2018-12-03 RX ADMIN — IBUPROFEN 600 MG: 600 TABLET ORAL at 21:15

## 2018-12-03 RX ADMIN — OXYCODONE HYDROCHLORIDE AND ACETAMINOPHEN 1 TABLET: 10; 325 TABLET ORAL at 06:52

## 2018-12-03 RX ADMIN — Medication 325 MG: at 11:06

## 2018-12-03 RX ADMIN — SIMETHICONE CHEW TAB 80 MG 80 MG: 80 TABLET ORAL at 21:15

## 2018-12-03 RX ADMIN — Medication 325 MG: at 18:41

## 2018-12-03 RX ADMIN — OXYCODONE HYDROCHLORIDE AND ACETAMINOPHEN 1 TABLET: 10; 325 TABLET ORAL at 15:03

## 2018-12-03 RX ADMIN — DOCUSATE SODIUM 100 MG: 100 CAPSULE, LIQUID FILLED ORAL at 07:53

## 2018-12-03 RX ADMIN — IBUPROFEN 600 MG: 600 TABLET ORAL at 15:03

## 2018-12-03 RX ADMIN — IBUPROFEN 600 MG: 600 TABLET ORAL at 05:50

## 2018-12-03 RX ADMIN — OXYCODONE HYDROCHLORIDE AND ACETAMINOPHEN 1 TABLET: 10; 325 TABLET ORAL at 11:06

## 2018-12-03 RX ADMIN — DOCUSATE SODIUM 100 MG: 100 CAPSULE, LIQUID FILLED ORAL at 21:15

## 2018-12-03 NOTE — LACTATION NOTE
12/03/18 0812   Maternal Information   Date of Referral 12/03/18   Person Making Referral other (see comments)  (courtesy)   Infant Reason for Referral (short feeding times)   Maternal Assessment   Breast Size Issue none   Breast Shape Bilateral:;round   Breast Density Bilateral:;soft   Nipples Bilateral:;graspable   Maternal Infant Feeding   Maternal Emotional State anxious;assist needed   Infant Positioning clutch/football;cross-cradle   Signs of Milk Transfer other (see comments)  (colostrum in shield)   Pain with Feeding no   Comfort Measures Before/During Feeding infant position adjusted;latch adjusted;maternal position adjusted   Nipple Shape After Feeding, Right round;symmetrical;appropriately projected   Latch Assistance yes   Equipment Type   Breast Pump Type double electric, personal  (ameeda pump at bedtime)   Breast Pump Flange Type hard   Breast Pump Flange Size 24 mm   Reproductive Interventions   Breast Care: Breastfeeding frequency of feeding adjusted

## 2018-12-03 NOTE — PAYOR COMM NOTE
"[place], Summer (23 y.o. Female)   [employer] ID#[id number]  Inpatient clinical (Labored x2 days)    From: Whitney Izquierdo  #[phone]  Fax#[phone]      Date of Birth Social Security Number Address Home Phone MRN    1995  [address]  [facility] [place] [address] [phone] [id number]    [place] Marital Status          None Single       Admission Date Admission Type Admitting Provider Attending Provider Department, Room/Bed    11/30/18 Elective Lolis Luu MD Carbajal, Tracey Owensby, MD [facility] MOTHER BABY 4B, N426/1    Discharge Date Discharge Disposition Discharge Destination                       Attending Provider:  Lolis Luu MD    Allergies:  No Known Allergies    Isolation:  None   Infection:  None   Code Status:  CPR    Ht:  154.9 cm (61\")   Wt:  83.9 kg (185 lb)    Admission Cmt:  None   Principal Problem:  None                Active Insurance as of 11/30/2018     Primary Coverage     Payor Plan Insurance Group Employer/Plan Group    HU[employer]NA MEDICAID [employer] [id number]     Payor Plan Address Payor Plan Phone Number Payor Plan Fax Number Effective Dates    PO  [phone]  11/30/2018 - None Entered    [facility] [id number]       Subscriber Name Subscriber Birth Date Member ID       WASHINGTON,SUMMER 1995 [id number]                 Emergency Contacts      (Rel.) Home Phone Work Phone Mobile Phone    Sheryl Salazar (Mother) [phone] -- --            Insurance Information                [employer] MEDICAID/[employer] Phone: [phone]    Subscriber: [place], Summer Subscriber#: [id number]    Group#: [id number] Precert#:           Treatment Team     Provider Relationship Specialty Contact    Lolis Luu MD Attending --  [phone]    Kaylan Ugarte RN Registered Nurse --      Any Reynolds Technician --      Tuyet Flores CNA OB Tech --      Irene Doe RN " Registered Nurse --      Manoj Lombardo MD Surgeon Obstetrics and Gynecology  [phone]    Patricia Ho, [facility] Pharmacist Pharmacy  [phone]          Problem List           Codes Noted - Resolved       Hospital    Post-dates pregnancy ICD-10-CM: O48.0  ICD-9-CM: 645.10 2018 - Present             History & Physical      Manoj Lombardo MD at 2018  9:37 PM        H&P reviewed. The patient was examined and there are no changes to the H&P. We will proceed with primary C/S for failure to progress    Electronically signed by Manoj Lombardo MD at 2018  9:38 PM   Source Note               Obstetric History and Physical    No chief complaint on file.      Subjective     Patient is a 23 y.o. female  currently at 40 1/7, who presents for an NST for post dates.    Her prenatal care is benign.  Her previous obstetric/gynecological history is noted for is non-contributory.    The following portions of the patients history were reviewed and updated as appropriate: current medications, allergies, past medical history and past surgical history .       Prenatal Information:  Prenatal Results    The patient does not have a working SANDY. Some results will not display without a working SANDY.   Initial Prenatal Labs     Test Value Reference Range Date Time    Hemoglobin        Hematocrit        Platelets        Rubella IgG        Hepatitis B SAg        Hepatitis C Ab        RPR        ABO        Rh        Antibody Screen        HIV        Urine Culture        Gonorrhea        Chlamydia        TSH              2nd and 3rd Trimester     Test Value Reference Range Date Time    Hemoglobin (repeated)        Hematocrit (repeated)        GCT        Antibody Screen (repeated)        GTT Fasting        GTT 1 Hr        GTT 2 Hr        GTT 3 Hr        Group B Strep              Drug Screening     Test Value Reference Range Date Time    Amphetamine Screen        Barbiturate Screen        Benzodiazepine  Screen        Methadone Screen        Phencyclidine Screen        Opiates Screen        THC Screen        Cocaine Screen        Propoxyphene Screen        Buprenorphine Screen        Methamphetamine Screen        Oxycodone Screen        Tryicyclic Antidepressants Screen              Other (Risk screening)     Test Value Reference Range Date Time    Varicella IgG        Parvovirus IgG        CMV IgG        Cystic Fibrosis        Hemoglobin electrophoresis        NIPT        MSAFP-4        AFP (for NTD only)                       Past OB History:     Obstetric History       T0      L0     SAB0   TAB0   Ectopic0   Molar0   Multiple0   Live Births0       # Outcome Date GA Lbr Michael/2nd Weight Sex Delivery Anes PTL Lv   1 Current                   Past Medical History: Past Medical History:   Diagnosis Date   • Allergic       Past Surgical History No past surgical history on file.   Family History: Family History   Problem Relation Age of Onset   • Cancer Mother    • Thyroid disease Mother    • Diabetes Paternal Aunt       Social History:  reports that  has never smoked. she has never used smokeless tobacco.   has no alcohol history on file.   has no drug history on file.        General ROS: Pertinent items are noted in HPI    Objective       Vital Signs Range for the last 24 hours  Temperature:     Temp Source:     BP: BP: ()/()    Pulse:     Respirations:     SPO2:     O2 Amount (l/min):     O2 Devices     Weight:       Physical Examination: General appearance - alert, well appearing, and in no distress    Presentation: vertex   Cervix: Exam by:     Dilation:  FT   Effacement:  50   Station:  -3       Fetal Heart Rate Assessment   Method:     Beats/min:     Baseline:  150   Varibility:  moderate   Accels: positive   Decels: positive   Tracing Category: 2     Uterine Assessment   Method:     Frequency (min):     Ctx Count in 10 min:     Duration:     Intensity:     Intensity by IUPC:     Resting Tone:      Resting Tone by IUPC:     Nadira Units:       Laboratory Results:   Radiology Review: BPP 8/, EFW 25%, grade 3 placenta  Other Studies:     Assessment/Plan       * No active hospital problems. *        Assessment:  1.  Intrauterine pregnancy at Unknown weeks gestation with variable decelerations present fetal status.    2.  induction of labor  for post dates with FHR deceleration  with unfavorable cervix  3.  Obstetrical history significant for is non-contributory.  4.  GBS status: negative    Plan:  1. cervical ripening with  intra-uterine mcnally catheter  2. Plan of care has been reviewed with patient and she VU  3.  Risks, benefits of treatment plan have been discussed.  4.  All questions have been answered.  5.  RWO reviewed U/S and FHR tracing- he talked with the patient in the office today. He will assume care.      WEN Cotton  2018  4:30 PM     Electronically signed by Mireille Thompson APRN at 2018  4:37 PM             Mireille Thompson APRN at 2018  4:10 PM            Obstetric History and Physical    No chief complaint on file.      Subjective     Patient is a 23 y.o. female  currently at 40 1/7, who presents for an NST for post dates.    Her prenatal care is benign.  Her previous obstetric/gynecological history is noted for is non-contributory.    The following portions of the patients history were reviewed and updated as appropriate: current medications, allergies, past medical history and past surgical history .       Prenatal Information:  Prenatal Results    The patient does not have a working SANDY. Some results will not display without a working SANDY.   Initial Prenatal Labs     Test Value Reference Range Date Time    Hemoglobin        Hematocrit        Platelets        Rubella IgG        Hepatitis B SAg        Hepatitis C Ab        RPR        ABO        Rh        Antibody Screen        HIV        Urine Culture        Gonorrhea        Chlamydia        TSH               2nd and 3rd Trimester     Test Value Reference Range Date Time    Hemoglobin (repeated)        Hematocrit (repeated)        GCT        Antibody Screen (repeated)        GTT Fasting        GTT 1 Hr        GTT 2 Hr        GTT 3 Hr        Group B Strep              Drug Screening     Test Value Reference Range Date Time    Amphetamine Screen        Barbiturate Screen        Benzodiazepine Screen        Methadone Screen        Phencyclidine Screen        Opiates Screen        THC Screen        Cocaine Screen        Propoxyphene Screen        Buprenorphine Screen        Methamphetamine Screen        Oxycodone Screen        Tryicyclic Antidepressants Screen              Other (Risk screening)     Test Value Reference Range Date Time    Varicella IgG        Parvovirus IgG        CMV IgG        Cystic Fibrosis        Hemoglobin electrophoresis        NIPT        MSAFP-4        AFP (for NTD only)                       Past OB History:     Obstetric History       T0      L0     SAB0   TAB0   Ectopic0   Molar0   Multiple0   Live Births0       # Outcome Date GA Lbr Michael/2nd Weight Sex Delivery Anes PTL Lv   1 Current                   Past Medical History: Past Medical History:   Diagnosis Date   • Allergic       Past Surgical History No past surgical history on file.   Family History: Family History   Problem Relation Age of Onset   • Cancer Mother    • Thyroid disease Mother    • Diabetes Paternal Aunt       Social History:  reports that  has never smoked. she has never used smokeless tobacco.   has no alcohol history on file.   has no drug history on file.        General ROS: Pertinent items are noted in HPI    Objective       Vital Signs Range for the last 24 hours  Temperature:     Temp Source:     BP: BP: ()/()    Pulse:     Respirations:     SPO2:     O2 Amount (l/min):     O2 Devices     Weight:       Physical Examination: General appearance - alert, well appearing, and in no  distress    Presentation: vertex   Cervix: Exam by:     Dilation:  FT   Effacement:  50   Station:  -3       Fetal Heart Rate Assessment   Method:     Beats/min:     Baseline:  150   Varibility:  moderate   Accels: positive   Decels: positive   Tracing Category: 2     Uterine Assessment   Method:     Frequency (min):     Ctx Count in 10 min:     Duration:     Intensity:     Intensity by IUPC:     Resting Tone:     Resting Tone by IUPC:     Nadira Units:       Laboratory Results:   Radiology Review: BPP 8/8, EFW 25%, grade 3 placenta  Other Studies:     Assessment/Plan       * No active hospital problems. *        Assessment:  1.  Intrauterine pregnancy at Unknown weeks gestation with variable decelerations present fetal status.    2.  induction of labor  for post dates with FHR deceleration  with unfavorable cervix  3.  Obstetrical history significant for is non-contributory.  4.  GBS status: negative    Plan:  1. cervical ripening with  intra-uterine mcnally catheter  2. Plan of care has been reviewed with patient and she VU  3.  Risks, benefits of treatment plan have been discussed.  4.  All questions have been answered.  5.  RWO reviewed U/S and FHR tracing- he talked with the patient in the office today. He will assume care.      WEN Cotton  11/30/2018  4:30 PM    Electronically signed by Mireille Thompson APRN at 11/30/2018  4:37 PM       ICU Vital Signs     Row Name 12/03/18 1200 12/03/18 0800 12/03/18 0400 12/03/18 0000 12/02/18 2000       Vitals    Temp  98.5 °F (36.9 °C)  98.3 °F (36.8 °C)  98.4 °F (36.9 °C)  98.6 °F (37 °C)  98 °F (36.7 °C)    Temp src  Oral  Oral  Oral  Oral  Oral    Pulse  113  109  115  117  121  (Abnormal)     Heart Rate Source  Monitor  Monitor  Monitor  Monitor  Monitor    Resp  18  18  20  20  22    Resp Rate Source  Visual  Visual  Visual  Visual  Visual    BP  120/80  121/80  135/80  114/67  118/72    Row Name 12/02/18 1519 12/02/18 1200 12/02/18 0944 12/02/18  0600 12/02/18 0500       Vitals    Temp  98.7 °F (37.1 °C)  98.3 °F (36.8 °C)  98.5 °F (36.9 °C)  97.6 °F (36.4 °C)  97.6 °F (36.4 °C)    Temp src  Oral  Oral  Oral  --  Axillary    Pulse  116  116  108  106  108    Heart Rate Source  Monitor  Monitor  Monitor  --  --    Resp  18  18  16  18  18    BP  130/74  120/80  127/84  127/80  122/75    Row Name 12/02/18 0400 12/02/18 0330 12/02/18 0310 12/02/18 0300 12/02/18 0245       Vitals    Temp  98.4 °F (36.9 °C)  98.3 °F (36.8 °C)  --  97.8 °F (36.6 °C)  --    Temp src  Axillary  --  --  Oral  --    Pulse  124  (Abnormal)   125  (Abnormal)   --  120  121  (Abnormal)     Heart Rate Source  --  --  --  Monitor  --    Resp  18  18  --  18 18    Resp Rate Source  --  --  --  Visual  --    BP  127/77  137/87  --  144/94  145/90    Noninvasive MAP (mmHg)  --  --  --  109  104    BP Location  --  --  --  Left arm  --    BP Method  --  --  --  Automatic  --    Patient Position  --  --  --  Lying  --       Oxygen Therapy    SpO2  --  --  --  96 %  98 %       Patient Observation    Observations  --  --  Pt wheeled [place] [facility] [place]  --  --    Row Name 12/02/18 0230 12/02/18 0217 12/02/18 0215 12/02/18 0200 12/02/18 0155       Vitals    Pulse  126  (Abnormal)   126  (Abnormal)   126  (Abnormal)   121  (Abnormal)   --    Resp  18  20  20  18  --    BP  139/96  137/92  133/120  (Abnormal)   129/99  --    Noninvasive MAP (mmHg)  112  103  128  104  --    Patient Position  --  --  -- pt repositioning in bed  --  --       Oxygen Therapy    SpO2  99 %  98 %  98 %  98 %  --       Patient Observation    Observations  --  --  --  --  Baby in recovery room from nursery    Row Name 12/02/18 0145 12/02/18 0135 12/02/18 0134 12/02/18 0130 12/02/18 0125       Vitals    Temp  --  --  --  --  98 °F (36.7 °C)    Temp src  --  --  --  --  Oral    Pulse  124  (Abnormal)   122  (Abnormal)   --  122  (Abnormal)   120    Heart Rate Source  --  --  --  --  Monitor    Resp  20  18 -- 18 18     Resp Rate Source  --  --  --  --  Visual    BP  123/105  (Abnormal)   122/77  --  119/85  125/76    Noninvasive MAP (mmHg)  114  89  --  99  95    BP Location  --  --  --  --  Left arm    BP Method  --  --  --  --  Automatic    Patient Position  -- pt's arm bent while drinking water  --  --  --  Lying       Oxygen Therapy    SpO2  98 %  97 %  96 %  96 %  --    Row Name 12/02/18 0003 12/01/18 2348 12/01/18 2318 12/01/18 2305 12/01/18 2248       Vitals    Temp  --  --  --  98.4 °F (36.9 °C)  --    Temp src  --  --  --  Oral  --    Pulse  129  (Abnormal)   126  (Abnormal)   121  (Abnormal)   125  (Abnormal)   126  (Abnormal)     BP  117/59  122/96  119/73  129/78  130/73    Row Name 12/01/18 2233 12/01/18 2218 12/01/18 2203 12/01/18 2148 12/01/18 2134       Vitals    Pulse  126  (Abnormal)   125  (Abnormal)   124  (Abnormal)   121  (Abnormal)   116    BP  127/70  126/74  114/67  109/61  117/62    Row Name 12/01/18 2118 12/01/18 2103 12/01/18 2048 12/01/18 2018 12/01/18 1948       Vitals    Temp  --  --  99.1 °F (37.3 °C)  --  --    Temp src  --  --  Oral  --  --    Pulse  130  (Abnormal)   123  (Abnormal)   129  (Abnormal)   127  (Abnormal)   139  (Abnormal)     BP  103/58  110/58  120/68  130/69  119/80    Row Name 12/01/18 1933 12/01/18 1918 12/01/18 1916 12/01/18 1904 12/01/18 1432       Vitals    Temp  --  --  98.8 °F (37.1 °C)  --  97.6 °F (36.4 °C)    Temp src  --  --  Oral  --  Oral    Pulse  131  (Abnormal)   127  (Abnormal)   --  120  --    Resp  --  --  18  --  16    Resp Rate Source  --  --  Visual  --  Stethoscope    BP  108/67  109/59  --  123/81  --    Row Name 12/01/18 1424 12/01/18 1409 12/01/18 1354 12/01/18 1341 12/01/18 1325       Vitals    Pulse  101  115  96  95  103    BP  134/66  120/75  127/78  126/78  119/82    Row Name 12/01/18 1311 12/01/18 1255 12/01/18 1241 12/01/18 1224 12/01/18 1210       Vitals    Pulse  97  96  100  97  107    BP  112/70  118/67  110/68  109/64  105/58    Row Name  "12/01/18 1154 12/01/18 1139 12/01/18 1124 12/01/18 1110 12/01/18 1054       Vitals    Pulse  95  108  102  113  103    BP  108/65  106/59  104/57  107/55  110/62    [facility] Name 12/01/18 1051 12/01/18 1048 12/01/18 1045 12/01/18 1042 12/01/18 1039       Vitals    Pulse  105  113  122  (Abnormal)   122  (Abnormal)   116    BP  119/70  114/61  115/71  114/72  117/76    Row Name 12/01/18 1036 12/01/18 1033 12/01/18 1030 12/01/18 1027 12/01/18 1024       Vitals    Pulse  115  122  (Abnormal)   118  113  115    BP  111/69  111/69  120/65  121/69  130/78    Row Name 12/01/18 1016 12/01/18 1000 12/01/18 0947 12/01/18 0932 12/01/18 0915       Vitals    Pulse  122  (Abnormal)   113  111  107  107    BP  127/78  123/68  114/73  162/83  129/93    Row Name 12/01/18 0900 12/01/18 0846 12/01/18 0815 12/01/18 0800 12/01/18 0742       Vitals    Temp  --  --  --  --  98.2 °F (36.8 °C)    Temp src  --  --  --  --  Oral    Pulse  100  91  104  101  110    Heart Rate Source  --  --  --  --  Monitor    Resp  --  --  --  --  16    Resp Rate Source  --  --  --  --  Stethoscope    BP  134/93  133/91  119/75  115/71  123/76    Noninvasive MAP (mmHg)  --  --  --  --  95    BP Location  --  --  --  --  Left arm    BP Method  --  --  --  --  Automatic    Patient Position  --  --  --  --  Lying    Row Name 12/01/18 0644 12/01/18 0541 12/01/18 0132 11/30/18 2120 11/30/18 1717       Vitals    Temp  --  98.6 °F (37 °C)  98.3 °F (36.8 °C)  98.3 °F (36.8 °C)  --    Temp src  --  Oral  Oral  Oral  --    Pulse  106  103  108  113  114    Heart Rate Source  --  Monitor  Monitor  Monitor  --    Resp  --  16  16  16  --    Resp Rate Source  --  Visual  Visual  Visual  --    BP  112/70  122/69  129/72  111/72  129/97    BP Location  --  Right arm  Right arm  Left arm  --    BP Method  --  Automatic  Automatic  Automatic  --    Patient Position  --  Lying  Lying  Lying  --    Row Name 11/30/18 1656                   Height and Weight    Height  154.9 cm (61\")  "       Height Method  Stated        Weight  83.9 kg (185 lb)        Ideal Body Weight (IBW) (kg)  48.15        BSA (Calculated - sq m)  1.83 sq meters        BMI (Calculated)  35        Weight in (lb) to have BMI = 25  132           Vitals    Temp  98.8 °F (37.1 °C)        Temp src  Oral        Pulse  104        Heart Rate Source  Monitor        Resp  16        Resp Rate Source  Visual        BP  135/92        BP Location  Right arm        BP Method  Automatic        Patient Position  Lying            Hospital Medications (active)       Dose Frequency Start End    butorphanol (STADOL) injection 2 mg 2 mg Every 2 Hours PRN 11/30/2018     Sig - Route: Infuse 2 mL into a venous catheter Every 2 (Two) Hours As Needed for Moderate Pain . - Intravenous    docusate sodium (COLACE) capsule 100 mg 100 mg 2 Times Daily PRN 12/2/2018     Sig - Route: Take 1 capsule by mouth 2 (Two) Times a Day As Needed for Constipation. - Oral    ferrous sulfate tablet 325 mg 325 mg 2 Times Daily With Meals 12/3/2018     Sig - Route: Take 1 tablet by mouth 2 (Two) Times a Day With Meals. - Oral    ibuprofen (ADVIL,MOTRIN) tablet 600 mg 600 mg Every 6 Hours PRN 12/2/2018     Sig - Route: Take 1 tablet by mouth Every 6 (Six) Hours As Needed for Mild Pain . - Oral    lactated ringers infusion 125 mL/hr Continuous 11/30/2018     Sig - Route: Infuse 125 mL/hr into a venous catheter Continuous. - Intravenous    lanolin ointment  As Needed 12/3/2018     Sig - Route: Apply  topically to the appropriate area as directed As Needed for Dry Skin. - Topical    ondansetron (ZOFRAN) tablet 4 mg 4 mg Every 8 Hours PRN 12/2/2018     Sig - Route: Take 1 tablet by mouth Every 8 (Eight) Hours As Needed for Nausea or Vomiting. - Oral    oxyCODONE-acetaminophen (PERCOCET)  MG per tablet 1 tablet 1 tablet Every 4 Hours PRN 12/2/2018 12/12/2018    Sig - Route: Take 1 tablet by mouth Every 4 (Four) Hours As Needed for Severe Pain . - Oral     oxyCODONE-acetaminophen (PERCOCET) 7.5-325 MG per tablet 1 tablet 1 tablet Every 4 Hours PRN 12/2/2018 12/12/2018    Sig - Route: Take 1 tablet by mouth Every 4 (Four) Hours As Needed for Moderate Pain . - Oral    ropivacaine (NAROPIN) 0.2 % injection 14 mL/hr Continuous 12/1/2018     Sig - Route: 28 mg/hr by Epidural route Continuous. - Epidural    simethicone (MYLICON) chewable tablet 80 mg 80 mg 4 Times Daily PRN 12/3/2018     Sig - Route: Chew 1 tablet 4 (Four) Times a Day As Needed for Flatulence. - Oral          Orders (last 7 days)     Start     Ordered    12/03/18 1000  ferrous sulfate tablet 325 mg  2 Times Daily With Meals      12/03/18 0914    12/03/18 0904  simethicone (MYLICON) chewable tablet 80 mg  4 Times Daily PRN      12/03/18 0905    12/03/18 0903  lanolin ointment  As Needed      12/03/18 0905    12/03/18 0740  Scan Slide  Once      12/03/18 0739    12/03/18 0600  Discontinue Indwelling Urinary Catheter in AM  Once      12/02/18 0350    12/03/18 0600  CBC & Differential  Timed      12/02/18 0350    12/03/18 0600  CBC Auto Differential  PROCEDURE ONCE      12/03/18 0002    12/03/18 0000  Remove Abdominal Dressing  Once      12/02/18 0350    12/02/18 2103  Call for heart rate greater than 130 BPM.  Nursing Communication  Once     Comments:  Call for heart rate greater than 130 BPM.    12/02/18 2103    12/02/18 2019  CBC (No Diff)  STAT      12/02/18 2019    12/02/18 2000  Discontinue IV  Continuous      12/02/18 1202    12/02/18 1202  Saline Lock IV  Continuous      12/02/18 1201    12/02/18 1201  Discontinue Indwelling Urinary Catheter  Once      12/02/18 1200    12/02/18 1201  Notify Provider if Bladder Distention Continues  Until Discontinued      12/02/18 1200    12/02/18 1201  Consult Pharmacist For Review of Medications That May Cause Urinary Retention - RN To Place Order for Consult it Needed  Continuous      12/02/18 1200    12/02/18 0800  Ambulate Patient  2 Times Daily     Comments:   After anesthesia wears off.    12/02/18 0350    12/02/18 0514  Diet Regular  Diet Effective Now     Comments:  Scrambled eggs, toast, fruit for breakfast please    12/02/18 0515    12/02/18 0400  Incentive spirometry RT  Every Hour      12/02/18 0350    12/02/18 0351  Code Status and Medical Interventions:  Continuous      12/02/18 0350    12/02/18 0351  Vital Signs Per hospital policy  Per Hospital Policy      12/02/18 0350    12/02/18 0351  Patient May Shower  Once     Comments:  After anesthesia wears off and with assistance    12/02/18 0350    12/02/18 0351  I/O  Every Shift      12/02/18 0350    12/02/18 0351  Fundal and Lochia Check  Per Hospital Policy     Comments:  Q 30 min x 2, Q 1 hr x 4, Q 4 hrs x 24 hrs, then Q shift.    12/02/18 0350    12/02/18 0351  Weigh Patient  Once      12/02/18 0350    12/02/18 0351  Continue Indwelling Urinary Catheter Already in Place  Once      12/02/18 0350    12/02/18 0351  Notify Provider if Bladder Distention Continues  Until Discontinued      12/02/18 0350    12/02/18 0351  Catheter Care  Every Shift      12/02/18 0350    12/02/18 0351  Turn Cough Deep Breathe  Once      12/02/18 0350    12/02/18 0351  Breast pump to bed  Once      12/02/18 0350    12/02/18 0351  Notify Physician  Until Discontinued      12/02/18 0350    12/02/18 0351  Diet Regular  Diet Effective Now,   Status:  Canceled      12/02/18 0350    12/02/18 0351  Advance Diet as Tolerated  Until Discontinued      12/02/18 0350    12/02/18 0351  If indicated -- Please administer RH Immunoglobulin based on results of cord blood evaluation and fetal screen lab tests, pharmacy to dispense  Continuous     Comments:  See process instructions for reference range details.    12/02/18 0350    12/02/18 0351  Place Sequential Compression Device  Once      12/02/18 0350    12/02/18 0351  Maintain Sequential Compression Device  Continuous      12/02/18 0350    12/02/18 0350  oxyCODONE-acetaminophen (PERCOCET) 7.5-325 MG  per tablet 1 tablet  Every 4 Hours PRN      12/02/18 0350    12/02/18 0350  ondansetron (ZOFRAN) tablet 4 mg  Every 8 Hours PRN      12/02/18 0350    12/02/18 0350  ibuprofen (ADVIL,MOTRIN) tablet 600 mg  Every 6 Hours PRN      12/02/18 0350    12/02/18 0350  docusate sodium (COLACE) capsule 100 mg  2 Times Daily PRN      12/02/18 0350    12/02/18 0230  ondansetron (ZOFRAN) injection 4 mg  Once,   Status:  Discontinued      12/02/18 0139 12/02/18 0230  oxytocin in sodium chloride (PITOCIN) 30 UNIT/500ML infusion solution  Once,   Status:  Discontinued      12/02/18 0139 12/02/18 0230  oxytocin in sodium chloride (PITOCIN) 30 UNIT/500ML infusion solution  Once,   Status:  Discontinued      12/02/18 0139 12/02/18 0144  oxyCODONE-acetaminophen (PERCOCET)  MG per tablet 1 tablet  Every 4 Hours PRN      12/02/18 0144    12/02/18 0140  Nurse May Remove Epidural Catheter After Delivery  Continuous      12/02/18 0139 12/02/18 0140  Transfer to postpartum when discharge criteria met.  Until Discontinued      12/02/18 0139 12/02/18 0140  Vital Signs  Per Hospital Policy      12/02/18 0139 12/02/18 0140  IV Site Care  Continuous      12/02/18 0139 12/02/18 0140  Respirations  Continuous     Comments:  If respiratory rate is less than 10/min, notify the Anesthesiologist    12/02/18 0139 12/02/18 0140  If respiratory is less than 8/min, see Narcan order below. Notify Anesthesiologist STAT.  Continuous      12/02/18 0139 12/02/18 0140  Blood Pressure and Pulse every 4 hours  Continuous,   Status:  Canceled      12/02/18 0139 12/02/18 0140  Activity and removal of mcnally catheter, per Obstetrician, on routine post-operative orders  Continuous,   Status:  Canceled      12/02/18 0139 12/02/18 0140  Notify Anesthesiologist for any questions/problems  Continuous,   Status:  Canceled      12/02/18 0139 12/02/18 0140  Diet Regular  Diet Effective Now,   Status:  Canceled      12/02/18 0139     12/02/18 0140  Fundal and Lochia Check  Per Hospital Policy     Comments:  Q 15 min x 4, Q 30 min x 2, then Q Shift    12/02/18 0139 12/02/18 0140  Notify Provider (Specified)  Until Discontinued      12/02/18 0139 12/02/18 0140  Vital Signs Per Hospital Policy  Per Hospital Policy      12/02/18 0139 12/02/18 0139  carboprost (HEMABATE) injection 250 mcg  As Needed,   Status:  Discontinued      12/02/18 0139 12/02/18 0139  methylergonovine (METHERGINE) injection 200 mcg  Once As Needed,   Status:  Discontinued      12/02/18 0139 12/02/18 0139  misoprostol (CYTOTEC) tablet 800 mcg  As Needed,   Status:  Discontinued      12/02/18 0139 12/02/18 0139  acetaminophen (TYLENOL) tablet 650 mg  Once As Needed,   Status:  Discontinued      12/02/18 0139 12/02/18 0139  ibuprofen (ADVIL,MOTRIN) tablet 600 mg  Once As Needed,   Status:  Discontinued      12/02/18 0139 12/02/18 0139  HYDROmorphone (DILAUDID) injection 0.5 mg  Every 30 Minutes PRN,   Status:  Discontinued      12/02/18 0139 12/01/18 2215  CeFAZolin in Sodium Chloride (ANCEF) IVPB solution 3 g  Once      12/01/18 2213    12/01/18 2048  Admit To Obstetrics Inpatient  Once      12/01/18 2048    12/01/18 1100  Sod Citrate-Citric Acid (BICITRA) solution 30 mL  Once      12/01/18 1004    12/01/18 1100  ropivacaine (NAROPIN) 0.2 % injection  Continuous      12/01/18 1004    12/01/18 1005  Vital Signs Per Anesthesia Guidelines  Continuous     Comments:  Every 3 Minutes x 20 Minutes following epidural dosing, then if stable every 15 Minutes    12/01/18 1004    12/01/18 1005  Start IV (16 or 18 Gauge)  Once      12/01/18 1004    12/01/18 1005  Fetal Heart Rate Monitor  Once      12/01/18 1004    12/01/18 1005  Nurse or Anesthesiologist to Remain With Patient for 15 Minutes Following Dosing  Continuous      12/01/18 1004    12/01/18 1005  Facilitate Maternal Position on Side & Maintain Uterine Displacement  Continuous      12/01/18 1008     12/01/18 1005  Consult Anesthesia Prior to Changing Epidural Infusion / Rate  Continuous      12/01/18 1004    12/01/18 1004  diphenhydrAMINE (BENADRYL) capsule 25 mg  Every 6 Hours PRN,   Status:  Discontinued      12/01/18 1004    12/01/18 1004  metoclopramide (REGLAN) injection 10 mg  Once As Needed      12/01/18 1004    12/01/18 1004  ondansetron (ZOFRAN) injection 4 mg  Once As Needed      12/01/18 1004    12/01/18 1004  lactated ringers bolus 1,500 mL  As Needed      12/01/18 1004    12/01/18 1004  ePHEDrine Sulfate 5 mg (25 MG/5ML syringe)  Every 10 Minutes PRN,   Status:  Discontinued      12/01/18 1004    12/01/18 0600  oxytocin in sodium chloride (PITOCIN) 30 UNIT/500ML infusion solution  Titrated,   Status:  Discontinued      11/30/18 1725    11/30/18 2322  butorphanol (STADOL) injection 2 mg  Every 2 Hours PRN      11/30/18 2323    11/30/18 2219  Insertion of Cervical Dilator  Once      11/30/18 2218    11/30/18 2100  sodium chloride 0.9 % flush 3 mL  Every 12 Hours Scheduled,   Status:  Discontinued      11/30/18 1725    11/30/18 1949  Preeclampsia Panel  STAT      11/30/18 1948    11/30/18 1815  lactated ringers infusion  Continuous      11/30/18 1725    11/30/18 1815  mineral oil liquid 30 mL  Once,   Status:  Discontinued      11/30/18 1725    11/30/18 1726  Code Status and Medical Interventions:  Continuous,   Status:  Canceled      11/30/18 1725    11/30/18 1726  Obtain Informed Consent  Once      11/30/18 1725    11/30/18 1726  Insert Peripheral IV  Once      11/30/18 1725    11/30/18 1726  Saline Lock & Maintain IV Access  Continuous      11/30/18 1725    11/30/18 1726  CBC (No Diff)  Once      11/30/18 1725    11/30/18 1726  External uterine contraction monitoring  Per Hospital Policy      11/30/18 1725    11/30/18 1726  Fetal nonstress test  Once     Comments:  No chief complaint on file.      11/30/18 1725    11/30/18 1726  Initiate Group Beta Strep (GBS) Prophylaxis Protocol, If Criteria Met   Continuous     Comments:  NO TREATMENT RECOMMENDED IF: 1) Maternal GBS Status Known Negative 2) Scheduled  Birth With Intact Membranes, Not in Labor 3) Maternal GBS Status Unknown, No Risk Factors  TREAT WITH ANTIBIOTICS IF:  1) Maternal GBS Status Known Positive 2) Maternal GBS Status Unknown With Risk Factors: a)  Previous Infant Affected By GBS Infection b) GBS Urinary Tract Infection (UTI) or Bacteriuria During Pregnancy c) Unexplained Maternal Fever (100.4F (38C) or Greater) During Labor d)  Prolonged Rupture of Membranes (18 or More Hours) e) Gestational Age Less Than 37 Weeks    18 1725    18 172  Mini- prep prior to delivery  Once      18 1725    18 172  Monitor fetal heart tones unless patient requests intermittent  Until Discontinued      18 1725    18 172  Notify Provider (Specified)  Until Discontinued      18 17218 172  Notify Provider if Membranes Ruptured, Bleeding Greater Than 1 Pad Per Hour, Fetal Heart Tone Abnormality or Severe Pain  Until Discontinued      18 1725    18 172  Notify Provider of Tachysystole (Per Hospital Algorithm)  Until Discontinued      18 1725    18 172  NPO Diet NPO Except: Ice Chips  Diet Effective Now,   Status:  Canceled      18 1725    18 172  Type & Screen  Once      18 1725    18 172  Vital Signs Per Hospital Policy  Per Hospital Policy      18 1725    18 172  VTE Prophylaxis Not Indicated: No Risk Factors (0); </= 3 (Low Risk)  Once      18 1725    18 172  Inpatient Laborist Consult  Once     Specialty:  Laborist  Provider:  (Not yet assigned)    18 1725    18 172  lactated ringers bolus 1,000 mL  Once As Needed      18 1725    18 172  sodium chloride 0.9 % flush 3-10 mL  As Needed,   Status:  Discontinued      18 1725    18 0000  Chlamydia trachomatis, Neisseria gonorrhoeae, PCR w/  confirmation - Swab, Vagina     Comments:  This is an external result entered through the Results Console.      11/30/18 2230 11/30/18 0000  Gonorrhea Screen - Swab,     Comments:  This is an external result entered through the Results Console.      11/30/18 2230 11/30/18 0000  Hepatitis C Antibody     Comments:  This is an external result entered through the Results Console.      11/30/18 2230 11/30/18 0000  Hepatitis B Surface Antigen     Comments:  This is an external result entered through the Results Console.      11/30/18 2230 11/30/18 0000  RPR     Comments:  This is an external result entered through the Results Console.      11/30/18 2230 11/30/18 0000  Rubella Antibody, IgG     Comments:  This is an external result entered through the Results Console.      11/30/18 2230 11/30/18 0000  HIV-1 Antibody, EIA     Comments:  This is an external result entered through the Results Console.      11/30/18 2230 11/30/18 0000  GTT 1 Hour     Comments:  This is an external result entered through the Results Console.      11/30/18 2231 11/30/18 0000  Urine Drug Screen - Urine, Clean Catch     Comments:  This is an external result entered through the Results Console.      11/30/18 2231    Unscheduled  Position Change - For Intra-Uterine Resusitation for Hypertonus, HyperStimulation or Non-Reassuring Fetal Status  As Needed      11/30/18 1725    Unscheduled  Up with Assistance  As Needed      12/02/18 0350    Unscheduled  Bladder Scan if Patient Unable to Void 4-6 Hours After Catheter Removal  As Needed      12/02/18 0350    Unscheduled  Straight Cath Every 4-6 Hours As Needed If Patient is Unable to Void After 4-6 Hours, Bladder Scan Volume is Greater Than 350-500mL & Patient Has Symptoms of Bladder Discomfort / Distention  As Needed      12/02/18 0350    Unscheduled  Consult Pharmacist For Review of Medications That May Cause Urinary Retention - RN To Place Order for Consult it Needed  As Needed       12/02/18 0350    Unscheduled  Schedule / Prompt Voiding For Patients With Urinary Incontinence  As Needed      12/02/18 0350    Unscheduled  Abdominal Wound Care  As Needed     Comments:  Postop day 1. Remove dressing and leave incision open to air.    12/02/18 0350    Unscheduled  Warm compress  As Needed      12/02/18 0350    Unscheduled  Apply ace wrap, tight bra, or binder  As Needed      12/02/18 0350    Unscheduled  Apply ice packs  As Needed      12/02/18 0350    Unscheduled  Bladder Scan if Patient Unable to Void 4-6 Hours After Catheter Removal  As Needed      12/02/18 1200    Unscheduled  If Bladder Scan Volume is Less Than 350-500mL & Patient is Without Symptoms of Bladder Discomfort / Distention Monitor Every 1-2 Hours for Spontaneous Void  As Needed      12/02/18 1200    Unscheduled  Straight Cath Every 4-6 Hours As Needed If Patient is Unable to Void After 4-6 Hours, Bladder Scan Volume is Greater Than 350-500mL & Patient Has Symptoms of Bladder Discomfort / Distention  As Needed      12/02/18 1200    Unscheduled  Schedule / Prompt Voiding For Patients With Urinary Incontinence  As Needed      12/02/18 1200    --  Prenatal Vit-Fe Fumarate-FA (PRENATAL 27-1) 27-1 MG tablet tablet  Daily      11/30/18 1650    Signed and Held  naloxone (NARCAN) injection 0.4 mg  Every 1 Hour PRN      Signed and Held             Operative/Procedure Notes (last 7 days) (Notes from 11/26/2018  4:34 PM through 12/3/2018  4:34 PM)      Rick Wilson MD at 11/30/2018 10:18 PM      Procedure Orders    1. Insertion of Cervical Dilator [[id number]] ordered by Rick Wilson MD at 11/30/18 7608            Pre-procedure Diagnoses    1. Post-term pregnancy, 40-42 weeks of gestation [O48.0]    2. Fetal heart rate decelerations affecting management of mother [O36.8390]    3. Unfavorable cervix in term pregnancy [O34.40]           Procedures    1. INSERTION OF CERVICAL DILATOR [OBO3 (Custom)]             Transcervical mcnally placed  per physician request.  FHT's class 1.  Patient tolerated well. 24 Cambodian, 60ml.    Rick Wilson MD  2018  10:19 PM        Electronically signed by Rick Wilson MD at 2018 10:19 PM     Manoj Lombardo MD at 2018 12:41 AM          Operative Note    Patient name: Summer Washington  YOB: 1995   MRN: [id number]  Admission Date: 2018  Referring Provider: Lolis Luu*    ID: 23 y.o.  at 40w4d    Preoperative Diagnosis:   Patient Active Problem List   Diagnosis   • Post-dates pregnancy     Failure to progress    Postoperative Diagnosis: Same as above.    Procedure(s): primarylow transverse  delivery     Surgeons: Surgeon(s) and Role:     * Manoj Lombardo MD - Primary     * Rick Wilson MD - Assisting    Anesthesia: Combined spinal epidural    Estimated Blood Loss: 700 mL mL    IV Fluids: [unfilled]    Preoperative antibiotic: Ancef (cefazolin) 2 grams    Blood products:   Blood Administration Record (From admission, onward)    None          Pathology: * No orders in the log *    Drains: Darnell catheter to gravity    Complications: None    Condition: Stable to recovery room                                          Infant:                 Gender: female  infant    Weight: 3115 g (6 lb 13.9 oz)     Apgars: 8   @ 1 minute /     9   @ 5 minutes    Cord gases: Venous:  @BABYNOHDR(BRIEFLAB, PHCVEN, BECVEN)@     Arterial:  @BABYNOHDR(BRIEFLAB, PHCART, BECART)@         Operative Summary:   After obtaining informed consent the patient was taken to the operating room where adequate anesthesia was obtained.  Darnell catheter was placed in the bladder preoperatively.  IV antibiotics were given preoperatively.       The abdomen was prepped and draped in the usual sterile fashion for  delivery.  After confirming adequate anesthesia a Pfannenstiel skin incision was made with the scalpel and carried through to the underlying layer of fascia.  The fascia was  incised in the midline and the incision extended laterally with the Mccormick scissors and with blunt dissection.       The upper aspect of the fascia was grasped with 2 Kocher clamps, elevated, and dissected off the underlying rectus muscles bluntly and with the Mccormick scissors.  The Kocher clamps were removed and applied to the inferior aspect of the fascia.  The fascia was dissected off of the rectus muscles in the same fashion.  The peritoneum was entered bluntly.  The incision was stretched and the bladder blade and Mccauley retractor inserted for visualization of the uterus.      The uterus was incised with the scalpel in a low transverse fashion.  The uterine incision was entered digitally and the incision extended bluntly in a cranial-caudal fashion.  Retractors were removed and membranes were ruptured.  The infant was delivered atraumatically from cephalic presentation.  The umbilical cord was clamped and cut and the nose and mouth bulb suctioned.  The infant was handed off to waiting pediatric staff.      Cord blood gases were not collected.  Cord blood was collected.  The placenta was removed using cord traction and uterine massage.  The uterus was exteriorized and cleared of all clots and debris.  The uterine incision was repaired with 1-0 Chromic in a running locked fashion. A single-layer technique was used.  Additional hemostatic measures required: none and electrocautery.    The incision was inspected and excellent hemostasis was noted.  The tubes and ovaries were noted to be normal. The uterus was returned to the abdomen.  The gutters were cleared of all clots and debris.  Irrigation was used.  The uterine incision was again inspected and found to be hemostatic.      The peritoneum was reapproximated with 2.0 Vicryl .  The fascia was closed with 0 Vicryl  in a running fashion (two segments).  The subcutaneous space was reapproximated using 3.0 Plain.      The skin was closed using staples.  The patient  was transferred to the recovery room in stable condition.    Manoj Lombardo MD  2018  1:23 AM      Electronically signed by Manoj Lombardo MD at 2018  1:25 AM     Manoj Lombardo MD at 2018  1:22 AM        See C/S op note    Electronically signed by Manoj Lombardo MD at 2018  1:22 AM          Physician Progress Notes (last 7 days) (Notes from 2018  4:34 PM through 12/3/2018  4:34 PM)      Kathe Boo APRN at 12/3/2018  9:12 AM            12/3/2018    Name:Geni Salazar    MR#:[id number]     PROGRESS NOTE:  Post-Op Day 1 S/P    HD:2    Subjective   23 y.o. yo Female  s/p CS at 40w4d doing well. Pain well controlled. Tolerating regular diet and having flatus. Lochia normal. Denies dizziness, weakness, shortness of breath.     Patient Active Problem List   Diagnosis   • Post-dates pregnancy        Objective    Vitals  Temp:  Temp:  [98 °F (36.7 °C)-98.7 °F (37.1 °C)] 98.3 °F (36.8 °C)  Temp src: Oral  BP:  BP: (114-135)/(67-84) 121/80  Pulse:  Heart Rate:  [108-121] 109  RR:   Resp:  [16-22] 18    General Awake, alert, no distress  Abdomen Soft, non-distended, fundus firm, below umbilicus, appropriately tender  Incision  Staples Intact, no erythema or exudate  Extremities Calves NT bilaterally     I/O last 3 completed shifts:  In: 1900 [I.V.:1900]  Out: 5150 [Urine:4450; Blood:700]    LABS:   Lab Results   Component Value Date    WBC 12.54 2018    HGB 7.9 (L) 2018    HCT 25.5 (L) 2018    MCV 73.9 (L) 2018     2018       Infant: female       Assessment   1.  POD 1 primary C/S   2.  Anemia, stable, asymptomatic. Start po iron.     Plan: Doing well.  Routine postoperative care . Advance.      Active Problems:   None      WEN Busby  12/3/2018 9:12 AM    Electronically signed by Kathe Boo APRN at 12/3/2018  9:14 AM     Manoj Lombardo MD at 2018 11:59 AM        Doing well post  op    Electronically signed by Manoj Lombardo MD at 2018 12:00 PM     Manoj Lombardo MD at 2018  9:33 PM        Slow progress ,     5-6/90/-3 station    FHT reactive with occ variable decels    A/P if no progress over the next hour we will proceed with a primary     Reviewed with pt and     Electronically signed by Manoj Lombardo MD at 2018  9:36 PM       Consult Notes (last 7 days) (Notes from 18 through 18)     No notes of this type exist for this encounter.

## 2018-12-03 NOTE — PLAN OF CARE
Problem: Patient Care Overview  Goal: Plan of Care Review  Outcome: Ongoing (interventions implemented as appropriate)   18 0430   Coping/Psychosocial   Plan of Care Reviewed With patient;significant other   Plan of Care Review   Progress improving   OTHER   Outcome Summary breastfeeding improving, education provided on feeding cues and breastfeeding assistance provided, pain controlled     Goal: Individualization and Mutuality  Outcome: Ongoing (interventions implemented as appropriate)    Goal: Discharge Needs Assessment  Outcome: Ongoing (interventions implemented as appropriate)      Problem: Postpartum ( Delivery) (Adult,Obstetrics,Pediatric)  Goal: Signs and Symptoms of Listed Potential Problems Will be Absent, Minimized or Managed (Postpartum)  Outcome: Ongoing (interventions implemented as appropriate)

## 2018-12-04 RX ADMIN — Medication 325 MG: at 16:02

## 2018-12-04 RX ADMIN — DOCUSATE SODIUM 100 MG: 100 CAPSULE, LIQUID FILLED ORAL at 08:52

## 2018-12-04 RX ADMIN — SIMETHICONE CHEW TAB 80 MG 80 MG: 80 TABLET ORAL at 16:01

## 2018-12-04 RX ADMIN — Medication 325 MG: at 07:47

## 2018-12-04 RX ADMIN — OXYCODONE HYDROCHLORIDE AND ACETAMINOPHEN 1 TABLET: 10; 325 TABLET ORAL at 08:52

## 2018-12-04 RX ADMIN — IBUPROFEN 600 MG: 600 TABLET ORAL at 16:02

## 2018-12-04 RX ADMIN — SIMETHICONE CHEW TAB 80 MG 80 MG: 80 TABLET ORAL at 20:56

## 2018-12-04 RX ADMIN — IBUPROFEN 600 MG: 600 TABLET ORAL at 04:00

## 2018-12-04 RX ADMIN — OXYCODONE HYDROCHLORIDE AND ACETAMINOPHEN 1 TABLET: 10; 325 TABLET ORAL at 16:02

## 2018-12-04 RX ADMIN — SIMETHICONE CHEW TAB 80 MG 80 MG: 80 TABLET ORAL at 08:52

## 2018-12-04 RX ADMIN — OXYCODONE HYDROCHLORIDE AND ACETAMINOPHEN 1 TABLET: 10; 325 TABLET ORAL at 20:56

## 2018-12-04 RX ADMIN — OXYCODONE HYDROCHLORIDE AND ACETAMINOPHEN 1 TABLET: 10; 325 TABLET ORAL at 04:00

## 2018-12-04 RX ADMIN — DOCUSATE SODIUM 100 MG: 100 CAPSULE, LIQUID FILLED ORAL at 20:55

## 2018-12-04 NOTE — LACTATION NOTE
Mom states baby is still sluggish.  Gave a few bottles last night along with nursing due to being worried about intake.  Encouraged mom to make sure she is pumping post feedings or any time baby is supplemented.

## 2018-12-04 NOTE — PLAN OF CARE
Problem: Patient Care Overview  Goal: Plan of Care Review  Outcome: Ongoing (interventions implemented as appropriate)   18 0619   Coping/Psychosocial   Plan of Care Reviewed With patient;significant other   Plan of Care Review   Progress improving   OTHER   Outcome Summary VSS, lochia WNL, staples JAMEEL without drainage, pain controlled with meds, passing flatus, breastfeeding was going well with shield but patient decided to supplement as well overnight     Goal: Individualization and Mutuality  Outcome: Ongoing (interventions implemented as appropriate)    Goal: Discharge Needs Assessment  Outcome: Ongoing (interventions implemented as appropriate)    Goal: Interprofessional Rounds/Family Conf  Outcome: Ongoing (interventions implemented as appropriate)      Problem: Postpartum ( Delivery) (Adult,Obstetrics,Pediatric)  Goal: Signs and Symptoms of Listed Potential Problems Will be Absent, Minimized or Managed (Postpartum)  Outcome: Ongoing (interventions implemented as appropriate)    Goal: Anesthesia/Sedation Recovery  Outcome: Ongoing (interventions implemented as appropriate)

## 2018-12-05 VITALS
TEMPERATURE: 98.8 F | HEIGHT: 61 IN | BODY MASS INDEX: 34.93 KG/M2 | SYSTOLIC BLOOD PRESSURE: 137 MMHG | OXYGEN SATURATION: 96 % | DIASTOLIC BLOOD PRESSURE: 88 MMHG | HEART RATE: 99 BPM | WEIGHT: 185 LBS | RESPIRATION RATE: 16 BRPM

## 2018-12-05 RX ORDER — IBUPROFEN 600 MG/1
600 TABLET ORAL EVERY 6 HOURS PRN
Qty: 30 TABLET | Refills: 0 | Status: SHIPPED | OUTPATIENT
Start: 2018-12-05

## 2018-12-05 RX ORDER — FERROUS SULFATE 325(65) MG
325 TABLET ORAL 2 TIMES DAILY WITH MEALS
Qty: 60 TABLET | Refills: 1 | Status: SHIPPED | OUTPATIENT
Start: 2018-12-05

## 2018-12-05 RX ADMIN — DOCUSATE SODIUM 100 MG: 100 CAPSULE, LIQUID FILLED ORAL at 08:20

## 2018-12-05 RX ADMIN — Medication 325 MG: at 05:00

## 2018-12-05 RX ADMIN — OXYCODONE HYDROCHLORIDE AND ACETAMINOPHEN 1 TABLET: 10; 325 TABLET ORAL at 00:31

## 2018-12-05 RX ADMIN — OXYCODONE HYDROCHLORIDE AND ACETAMINOPHEN 1 TABLET: 10; 325 TABLET ORAL at 08:20

## 2018-12-05 RX ADMIN — IBUPROFEN 600 MG: 600 TABLET ORAL at 08:20

## 2018-12-05 RX ADMIN — IBUPROFEN 600 MG: 600 TABLET ORAL at 00:31

## 2018-12-05 NOTE — DISCHARGE SUMMARY
Discharge Summary    Date of Admission: 2018  Date of Discharge:  2018      Patient: Geni Salazar      MR#:[id number]    Primary Surgeon/OB: Manoj Lombardo MD    Discharge Surgeon/OB: maninder    Presenting Problem/History of Present Illness  Post-dates pregnancy [O48.0]     Patient Active Problem List   Diagnosis   • Post-dates pregnancy         Discharge Diagnosis:  section at 40w4d    Procedures:  , Low Transverse     2018    12:51 AM        Rh Immune globulin given: not applicable    Discharge Date: 2018; Discharge Time: 9:19 AM    Early Discharge:  NO    Hospital Course  Patient is a 23 y.o. female  at 40w4d status post  section with uneventful postoperative recovery.  Patient was advanced to regular diet on postoperative day#1.  On discharge, ambulating, tolerating a regular diet without any difficulties and her incision is dry, clean and intact.     Infant:   female  fetus 3115 g (6 lb 13.9 oz)  with Apgar scores of 8  , 9   at five minutes.    Condition on Discharge:  Stable    Vital Signs  Temp:  [98 °F (36.7 °C)-98.8 °F (37.1 °C)] 98.8 °F (37.1 °C)  Heart Rate:  [] 99  Resp:  [16-20] 16  BP: (124-137)/(81-97) 137/88    Lab Results   Component Value Date    WBC 12.54 2018    HGB 7.9 (L) 2018    HCT 25.5 (L) 2018    MCV 73.9 (L) 2018     2018       Discharge Disposition  Home or Self Care    Discharge Medications     Discharge Medications      New Medications      Instructions Start Date   ferrous sulfate 325 (65 FE) MG tablet   325 mg, Oral, 2 Times Daily With Meals      ibuprofen 600 MG tablet  Commonly known as:  ADVIL,MOTRIN   600 mg, Oral, Every 6 Hours PRN         Continue These Medications      Instructions Start Date   Prenatal 27-1 27-1 MG tablet tablet   1 tablet, Oral, Daily         Stop These Medications    DEEPTI 3-0.02 MG per tablet  Generic drug:  drospirenone-ethinyl estradiol             Discharge Diet:     Activity at Discharge:   Activity Instructions     Discharge Activity Restrictions      1) No driving for 2 weeks and no longer taking narcotics.   2) Return to school / work in 6-8 weeks .  3) May shower  4) Do not lift / push / pull more then 10-15 lbs.    Pelvic Rest            Follow-up Appointments  No future appointments.  Additional Instructions for the Follow-ups that You Need to Schedule     Call MD With Problems / Concerns   As directed      Discharge Follow-up with Specified Provider: Jus; 2 Weeks   As directed      To:  Luu    Follow Up:  2 Weeks         Discharge Follow-up with Specified Provider: Luu; 2 Weeks   As directed      To:  Luu    Follow Up:  2 Weeks               Mireille Thompson, WEN  12/05/18  9:18 AM  Csd

## 2018-12-05 NOTE — PLAN OF CARE
Problem: Patient Care Overview  Goal: Plan of Care Review  Outcome: Ongoing (interventions implemented as appropriate)   18 0722   Coping/Psychosocial   Plan of Care Reviewed With patient   Plan of Care Review   Progress improving   OTHER   Outcome Summary VSS - other than one elevated B/P of 139/97. Rechecked and BP was wnl.Incision and lochia wnl. Mom mainly bottlefeeding. Taking PO meds for pain.      Goal: Individualization and Mutuality  Outcome: Ongoing (interventions implemented as appropriate)    Goal: Discharge Needs Assessment  Outcome: Ongoing (interventions implemented as appropriate)      Problem: Postpartum ( Delivery) (Adult,Obstetrics,Pediatric)  Goal: Signs and Symptoms of Listed Potential Problems Will be Absent, Minimized or Managed (Postpartum)  Outcome: Ongoing (interventions implemented as appropriate)

## (undated) DEVICE — SUT VIC 2/0 CT1 27IN J339H BX/36

## (undated) DEVICE — PK C/SECT 10

## (undated) DEVICE — TRY SPINE BLCK WHITACRE 25G 3X5IN

## (undated) DEVICE — SOL IRR NACL 0.9PCT BT 1000ML

## (undated) DEVICE — SUT PLAIN  3/0 CT1 27IN 842H

## (undated) DEVICE — SOL IRR H2O BTL 1000ML STRL

## (undated) DEVICE — SUT GUT CHRM 1 CTX 36IN 905H

## (undated) DEVICE — GLV SURG SENSICARE W/ALOE PF LF 8 STRL

## (undated) DEVICE — PROXIMATE RH ROTATING HEAD SKIN STAPLERS (35 WIDE) CONTAINS 35 STAINLESS STEEL STAPLES: Brand: PROXIMATE

## (undated) DEVICE — MAT PREVALON MOBL TRANSFR AIR WO/PAD 39X80IN